# Patient Record
Sex: MALE | URBAN - METROPOLITAN AREA
[De-identification: names, ages, dates, MRNs, and addresses within clinical notes are randomized per-mention and may not be internally consistent; named-entity substitution may affect disease eponyms.]

---

## 2020-03-12 ENCOUNTER — INPATIENT (INPATIENT)
Facility: HOSPITAL | Age: 75
LOS: 11 days | Discharge: PSYCHIATRIC OR SUBSTANCE USE FACILITY | DRG: 884 | End: 2020-03-24
Attending: FAMILY MEDICINE | Admitting: HOSPITALIST
Payer: MEDICARE

## 2020-03-12 VITALS
SYSTOLIC BLOOD PRESSURE: 174 MMHG | DIASTOLIC BLOOD PRESSURE: 85 MMHG | OXYGEN SATURATION: 97 % | TEMPERATURE: 97 F | HEIGHT: 74 IN | WEIGHT: 214.95 LBS | RESPIRATION RATE: 16 BRPM | HEART RATE: 65 BPM

## 2020-03-12 DIAGNOSIS — E83.52 HYPERCALCEMIA: ICD-10-CM

## 2020-03-12 DIAGNOSIS — R41.0 DISORIENTATION, UNSPECIFIED: ICD-10-CM

## 2020-03-12 DIAGNOSIS — R45.1 RESTLESSNESS AND AGITATION: ICD-10-CM

## 2020-03-12 DIAGNOSIS — F03.91 UNSPECIFIED DEMENTIA WITH BEHAVIORAL DISTURBANCE: ICD-10-CM

## 2020-03-12 DIAGNOSIS — Z29.9 ENCOUNTER FOR PROPHYLACTIC MEASURES, UNSPECIFIED: ICD-10-CM

## 2020-03-12 LAB
ALBUMIN SERPL ELPH-MCNC: 3.5 G/DL — SIGNIFICANT CHANGE UP (ref 3.3–5)
ALP SERPL-CCNC: 102 U/L — SIGNIFICANT CHANGE UP (ref 40–120)
ALT FLD-CCNC: 19 U/L — SIGNIFICANT CHANGE UP (ref 12–78)
ANION GAP SERPL CALC-SCNC: 2 MMOL/L — LOW (ref 5–17)
APTT BLD: 31.7 SEC — SIGNIFICANT CHANGE UP (ref 28.5–37)
AST SERPL-CCNC: 17 U/L — SIGNIFICANT CHANGE UP (ref 15–37)
BASOPHILS # BLD AUTO: 0.02 K/UL — SIGNIFICANT CHANGE UP (ref 0–0.2)
BASOPHILS NFR BLD AUTO: 0.3 % — SIGNIFICANT CHANGE UP (ref 0–2)
BILIRUB SERPL-MCNC: 1.1 MG/DL — SIGNIFICANT CHANGE UP (ref 0.2–1.2)
BUN SERPL-MCNC: 21 MG/DL — SIGNIFICANT CHANGE UP (ref 7–23)
CALCIUM SERPL-MCNC: 10.4 MG/DL — HIGH (ref 8.5–10.1)
CHLORIDE SERPL-SCNC: 105 MMOL/L — SIGNIFICANT CHANGE UP (ref 96–108)
CO2 SERPL-SCNC: 29 MMOL/L — SIGNIFICANT CHANGE UP (ref 22–31)
CREAT SERPL-MCNC: 1.2 MG/DL — SIGNIFICANT CHANGE UP (ref 0.5–1.3)
EOSINOPHIL # BLD AUTO: 0.1 K/UL — SIGNIFICANT CHANGE UP (ref 0–0.5)
EOSINOPHIL NFR BLD AUTO: 1.3 % — SIGNIFICANT CHANGE UP (ref 0–6)
GLUCOSE SERPL-MCNC: 119 MG/DL — HIGH (ref 70–99)
HCT VFR BLD CALC: 48.4 % — SIGNIFICANT CHANGE UP (ref 39–50)
HGB BLD-MCNC: 16.4 G/DL — SIGNIFICANT CHANGE UP (ref 13–17)
IMM GRANULOCYTES NFR BLD AUTO: 0.7 % — SIGNIFICANT CHANGE UP (ref 0–1.5)
INR BLD: 1.09 RATIO — SIGNIFICANT CHANGE UP (ref 0.88–1.16)
LIDOCAIN IGE QN: 444 U/L — HIGH (ref 73–393)
LYMPHOCYTES # BLD AUTO: 0.88 K/UL — LOW (ref 1–3.3)
LYMPHOCYTES # BLD AUTO: 11.6 % — LOW (ref 13–44)
MCHC RBC-ENTMCNC: 29.6 PG — SIGNIFICANT CHANGE UP (ref 27–34)
MCHC RBC-ENTMCNC: 33.9 GM/DL — SIGNIFICANT CHANGE UP (ref 32–36)
MCV RBC AUTO: 87.4 FL — SIGNIFICANT CHANGE UP (ref 80–100)
MONOCYTES # BLD AUTO: 0.34 K/UL — SIGNIFICANT CHANGE UP (ref 0–0.9)
MONOCYTES NFR BLD AUTO: 4.5 % — SIGNIFICANT CHANGE UP (ref 2–14)
NEUTROPHILS # BLD AUTO: 6.18 K/UL — SIGNIFICANT CHANGE UP (ref 1.8–7.4)
NEUTROPHILS NFR BLD AUTO: 81.6 % — HIGH (ref 43–77)
NRBC # BLD: 0 /100 WBCS — SIGNIFICANT CHANGE UP (ref 0–0)
PLATELET # BLD AUTO: 205 K/UL — SIGNIFICANT CHANGE UP (ref 150–400)
POTASSIUM SERPL-MCNC: 5.1 MMOL/L — SIGNIFICANT CHANGE UP (ref 3.5–5.3)
POTASSIUM SERPL-SCNC: 5.1 MMOL/L — SIGNIFICANT CHANGE UP (ref 3.5–5.3)
PROT SERPL-MCNC: 7.4 G/DL — SIGNIFICANT CHANGE UP (ref 6–8.3)
PROTHROM AB SERPL-ACNC: 12.2 SEC — SIGNIFICANT CHANGE UP (ref 10–12.9)
RBC # BLD: 5.54 M/UL — SIGNIFICANT CHANGE UP (ref 4.2–5.8)
RBC # FLD: 13 % — SIGNIFICANT CHANGE UP (ref 10.3–14.5)
SODIUM SERPL-SCNC: 136 MMOL/L — SIGNIFICANT CHANGE UP (ref 135–145)
WBC # BLD: 7.57 K/UL — SIGNIFICANT CHANGE UP (ref 3.8–10.5)
WBC # FLD AUTO: 7.57 K/UL — SIGNIFICANT CHANGE UP (ref 3.8–10.5)

## 2020-03-12 PROCEDURE — 93010 ELECTROCARDIOGRAM REPORT: CPT

## 2020-03-12 PROCEDURE — 99285 EMERGENCY DEPT VISIT HI MDM: CPT

## 2020-03-12 PROCEDURE — 70450 CT HEAD/BRAIN W/O DYE: CPT | Mod: 26

## 2020-03-12 PROCEDURE — 71045 X-RAY EXAM CHEST 1 VIEW: CPT | Mod: 26

## 2020-03-12 PROCEDURE — 99222 1ST HOSP IP/OBS MODERATE 55: CPT | Mod: GC,AI

## 2020-03-12 PROCEDURE — 99221 1ST HOSP IP/OBS SF/LOW 40: CPT

## 2020-03-12 RX ORDER — HALOPERIDOL DECANOATE 100 MG/ML
5 INJECTION INTRAMUSCULAR ONCE
Refills: 0 | Status: COMPLETED | OUTPATIENT
Start: 2020-03-12 | End: 2020-03-12

## 2020-03-12 RX ORDER — ENOXAPARIN SODIUM 100 MG/ML
40 INJECTION SUBCUTANEOUS DAILY
Refills: 0 | Status: DISCONTINUED | OUTPATIENT
Start: 2020-03-12 | End: 2020-03-24

## 2020-03-12 RX ORDER — HALOPERIDOL DECANOATE 100 MG/ML
0.5 INJECTION INTRAMUSCULAR EVERY 6 HOURS
Refills: 0 | Status: DISCONTINUED | OUTPATIENT
Start: 2020-03-12 | End: 2020-03-12

## 2020-03-12 RX ORDER — OLANZAPINE 15 MG/1
10 TABLET, FILM COATED ORAL EVERY 6 HOURS
Refills: 0 | Status: DISCONTINUED | OUTPATIENT
Start: 2020-03-12 | End: 2020-03-24

## 2020-03-12 RX ORDER — SODIUM CHLORIDE 9 MG/ML
500 INJECTION, SOLUTION INTRAVENOUS ONCE
Refills: 0 | Status: DISCONTINUED | OUTPATIENT
Start: 2020-03-12 | End: 2020-03-15

## 2020-03-12 RX ORDER — SODIUM CHLORIDE 9 MG/ML
3 INJECTION INTRAMUSCULAR; INTRAVENOUS; SUBCUTANEOUS ONCE
Refills: 0 | Status: COMPLETED | OUTPATIENT
Start: 2020-03-12 | End: 2020-03-12

## 2020-03-12 RX ADMIN — HALOPERIDOL DECANOATE 5 MILLIGRAM(S): 100 INJECTION INTRAMUSCULAR at 12:20

## 2020-03-12 RX ADMIN — Medication 2 MILLIGRAM(S): at 14:53

## 2020-03-12 RX ADMIN — Medication 1 MILLIGRAM(S): at 13:15

## 2020-03-12 RX ADMIN — Medication 1 MILLIGRAM(S): at 12:16

## 2020-03-12 RX ADMIN — OLANZAPINE 10 MILLIGRAM(S): 15 TABLET, FILM COATED ORAL at 17:57

## 2020-03-12 RX ADMIN — SODIUM CHLORIDE 3 MILLILITER(S): 9 INJECTION INTRAMUSCULAR; INTRAVENOUS; SUBCUTANEOUS at 12:07

## 2020-03-12 NOTE — H&P ADULT - NSHPSOCIALHISTORY_GEN_ALL_CORE
Lives alone. Denies tobacco, alcohol, or illicit drug use. Ambulates independently. Has not been influenza or pneumonia vaccinated
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
## 2020-03-12 NOTE — H&P ADULT - ASSESSMENT
The patient is a 73 yo male with questionable pmhx of questionable dementia(undiagnosed) per son Orville who presents with altered mental status admitted for confusion.

## 2020-03-12 NOTE — ED ADULT NURSE NOTE - NSIMPLEMENTINTERV_GEN_ALL_ED
Implemented All Fall Risk Interventions:  [place] to call system. Call bell, personal items and telephone within reach. Instruct patient to call for assistance. Room bathroom lighting operational. Non-slip footwear when patient is off stretcher. Physically safe environment: no spills, clutter or unnecessary equipment. Stretcher in lowest position, wheels locked, appropriate side rails in place. Provide visual cue, wrist band, yellow gown, etc. Monitor gait and stability. Monitor for mental status changes and reorient to person, place, and time. Review medications for side effects contributing to fall risk. Reinforce activity limits and safety measures with patient and family.

## 2020-03-12 NOTE — H&P ADULT - NSHPREVIEWOFSYSTEMS_GEN_ALL_CORE
CONSTITUTIONAL: denies fever, chills, fatigue, weakness  HEENT: denies blurred vision, sore throat  CARDIOVASCULAR: denies chest pain, chest pressure, palpitations  RESPIRATORY: denies shortness of breath, sputum production  GASTROINTESTINAL: denies nausea, vomiting, diarrhea, abdominal pain  GENITOURINARY: denies dysuria, discharge  NEUROLOGICAL: denies numbness, headache, focal weakness  MUSCULOSKELETAL: denies new joint pain, muscle aches  HEMATOLOGIC: denies gross bleeding
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
## 2020-03-12 NOTE — BEHAVIORAL HEALTH ASSESSMENT NOTE - NSBHCHARTREVIEWVS_PSY_A_CORE FT
Vital Signs Last 24 Hrs  T(C): 36.3 (12 Mar 2020 11:33), Max: 36.3 (12 Mar 2020 11:33)  T(F): 97.4 (12 Mar 2020 11:33), Max: 97.4 (12 Mar 2020 11:33)  HR: 73 (12 Mar 2020 15:27) (58 - 73)  BP: 186/87 (12 Mar 2020 15:27) (148/67 - 186/87)  BP(mean): --  RR: 24 (12 Mar 2020 15:27) (16 - 24)  SpO2: 96% (12 Mar 2020 15:27) (96% - 97%)

## 2020-03-12 NOTE — H&P ADULT - PROBLEM SELECTOR PLAN 4
- DVT prophylaxis with Lovenox  IMPROVE VTE Individual Risk Assessment  RISK                                                                Points  [  ] Previous VTE                                                  3  [  ] Thrombophilia                                               2  [  ] Lower limb paralysis                                      2        (unable to hold up >15 seconds)   [  ] Current Cancer                                              2         (within 6 months)  [  ] Immobilization > 24 hrs                                1  [  ] ICU/CCU stay > 24 hours                              1  [ X ] Age > 60                                                      1  IMPROVE VTE Score __1_______

## 2020-03-12 NOTE — ED ADULT NURSE REASSESSMENT NOTE - NS ED NURSE REASSESS COMMENT FT1
1450 pt became extremely agitated Spoke w/ dr kuhn Ativan given as charted per order Pt punching kicking @ staff attempting to get out of bed Code rosie called

## 2020-03-12 NOTE — H&P ADULT - PROBLEM SELECTOR PLAN 1
- differential includes progressive dementia vs infectious vs possible TIA  - questionable hx of dementia as son admits symptoms have been progressive x2 year  - CT head involutional changes with volume loss. No acute abnormality suggested  - CxR no acute finding  - F/u UA, UC  - Given: Ativan 1mg x2, Ativan 1mg, Haldol 5mg x1  - Psych Dr. Xiao consulted  - Social work following as pt's family now considering assisted living/nursing home - differential includes progressive dementia vs infectious vs possible TIA  - less likely TIA as symptoms not acute  - questionable hx of dementia as son admits symptoms have been progressive x2 year  - CT head involutional changes with volume loss. No acute abnormality suggested  - CxR no acute finding  - F/u UA, UC  - Given: Ativan 1mg x2, Ativan 1mg, Haldol 5mg x1  - Psych Dr. Xiao consulted  - Social work following as pt's family now considering assisted living/nursing home - differential includes progressive dementia with behavioal symptoms vs infectious vs possible TIA  - less likely TIA as symptoms not acute  - questionable hx of dementia as son admits symptoms have been progressive x2 year  - CT head involutional changes with volume loss. No acute abnormality suggested  - CxR no acute finding  - F/u UA, UC  - Given: Ativan 1mg x2, Ativan 1mg, Haldol 5mg x1  - Psych Dr. Xiao consulted  - Social work following as pt's family now considering assisted living/nursing home likely due to dementia with behavioral disturbance  - questionable hx of dementia as son admits symptoms have been progressive x2 year  - CT head involutional changes with volume loss. No acute abnormality suggested  - CxR no acute finding  - F/u UA,  - Given: Ativan 1mg x2, Ativan 1mg, Haldol 5mg x1  - Psych Dr. Xiao consulted  - Social work following as pt's family now considering assisted living/nursing home
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
## 2020-03-12 NOTE — H&P ADULT - PROBLEM SELECTOR PLAN 3
IMPROVE VTE Individual Risk Assessment  - DVT prophylaxis with lovenox  RISK                                                                Points  [  ] Previous VTE                                                  3  [  ] Thrombophilia                                               2  [  ] Lower limb paralysis                                      2        (unable to hold up >15 seconds)   [  ] Current Cancer                                              2         (within 6 months)  [  ] Immobilization > 24 hrs                                1  [  ] ICU/CCU stay > 24 hours                              1  [ X ] Age > 60                                                      1  IMPROVE VTE Score __1_______ - 10.4  - likely 2/2 to dehydration  - stat 500cc bolus LR ordered  - f/u am CMP
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
## 2020-03-12 NOTE — BEHAVIORAL HEALTH ASSESSMENT NOTE - NSBHCHARTREVIEWLAB_PSY_A_CORE FT
16.4   7.57  )-----------( 205      ( 12 Mar 2020 12:05 )             48.4   03-12    136  |  105  |  21  ----------------------------<  119<H>  5.1   |  29  |  1.20    Ca    10.4<H>      12 Mar 2020 12:05    TPro  7.4  /  Alb  3.5  /  TBili  1.1  /  DBili  x   /  AST  17  /  ALT  19  /  AlkPhos  102  03-12
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
## 2020-03-12 NOTE — BEHAVIORAL HEALTH ASSESSMENT NOTE - NSBHCHARTREVIEWIMAGING_PSY_A_CORE FT
< from: CT Head No Cont (03.12.20 @ 13:47) >    EXAM:  CT BRAIN                            PROCEDURE DATE:  03/12/2020          INTERPRETATION:  INDICATION:  Confusion. Altered mental status.  TECHNIQUE:  A non contrast 2.5mm axial CT study of the brain was performed from skull base to vertex. Coronal and sagittal reformations were generated from the axial data.  COMPARISON EXAMINATION:  No prior    FINDINGS:      HEMISPHERES:  There are involutional changes with volume loss. This study is degraded by motion but is diagnostic. No acute infarct or hemorrhage is suggested. No space-occupying lesion is noted.  VENTRICLES:  Midline and normal in size.  POSTERIOR FOSSA:  The brain stem and cerebellum are unremarkable.  No CP angle lesion noted.  EXTRACEREBRAL SPACES:  No subdural or epidural collections are noted.  SKULL BASE AND CALVARIUM:  Appears intact.  No fracture or destructive lesion is identified.  SINUSES AND MASTOIDS:  Clear.  MISCELLANEOUS:  No orbital or suprasellar abnormality noted.      IMPRESSION:    1)  involutional changes with volume loss. No acute abnormality suggested.  2)  clear sinuses and mastoids..                  ROGE VARELA M.D., ATTENDING RADIOLOGIST  This document has been electronically signed. Mar 12 2020  1:57PM    < end of copied text >
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
## 2020-03-12 NOTE — H&P ADULT - NSHPPHYSICALEXAM_GEN_ALL_CORE
T(C): 36.3 (03-12-20 @ 11:33), Max: 36.3 (03-12-20 @ 11:33)  HR: 73 (03-12-20 @ 15:27) (58 - 73)  BP: 186/87 (03-12-20 @ 15:27) (148/67 - 186/87)  RR: 24 (03-12-20 @ 15:27) (16 - 24)  SpO2: 96% (03-12-20 @ 15:27) (96% - 97%)    GENERAL: patient appears well, no acute distress, sleepy 2/2 ativan  EYES: sclera clear, no exudates  ENMT: oropharynx clear without erythema, no exudates, moist mucous membranes  LUNGS: good air entry bilaterally, clear to auscultation, symmetric breath sounds, no wheezing or rhonchi appreciated  HEART: soft S1/S2, regular rate and rhythm, no murmurs noted, no lower extremity edema  GASTROINTESTINAL: abdomen is soft, nontender, nondistended, normoactive bowel sounds, no palpable masses  INTEGUMENT: good skin turgor, warm skin, appears well perfused; excoriation of skin b/l LE  MUSCULOSKELETAL: no clubbing or cyanosis, no obvious deformity  NEUROLOGIC: awake, alert, oriented x3, good muscle tone in 4 extremities, no obvious sensory deficits

## 2020-03-13 LAB
ALBUMIN SERPL ELPH-MCNC: 3.8 G/DL — SIGNIFICANT CHANGE UP (ref 3.3–5)
ALP SERPL-CCNC: 108 U/L — SIGNIFICANT CHANGE UP (ref 40–120)
ALT FLD-CCNC: 19 U/L — SIGNIFICANT CHANGE UP (ref 12–78)
ANION GAP SERPL CALC-SCNC: 6 MMOL/L — SIGNIFICANT CHANGE UP (ref 5–17)
AST SERPL-CCNC: 20 U/L — SIGNIFICANT CHANGE UP (ref 15–37)
BASOPHILS # BLD AUTO: 0.03 K/UL — SIGNIFICANT CHANGE UP (ref 0–0.2)
BASOPHILS NFR BLD AUTO: 0.2 % — SIGNIFICANT CHANGE UP (ref 0–2)
BILIRUB SERPL-MCNC: 2.3 MG/DL — HIGH (ref 0.2–1.2)
BUN SERPL-MCNC: 18 MG/DL — SIGNIFICANT CHANGE UP (ref 7–23)
CALCIUM SERPL-MCNC: 10.4 MG/DL — HIGH (ref 8.5–10.1)
CHLORIDE SERPL-SCNC: 104 MMOL/L — SIGNIFICANT CHANGE UP (ref 96–108)
CO2 SERPL-SCNC: 29 MMOL/L — SIGNIFICANT CHANGE UP (ref 22–31)
CREAT SERPL-MCNC: 1.1 MG/DL — SIGNIFICANT CHANGE UP (ref 0.5–1.3)
EOSINOPHIL # BLD AUTO: 0.18 K/UL — SIGNIFICANT CHANGE UP (ref 0–0.5)
EOSINOPHIL NFR BLD AUTO: 1.5 % — SIGNIFICANT CHANGE UP (ref 0–6)
FOLATE SERPL-MCNC: 6.7 NG/ML — SIGNIFICANT CHANGE UP
GLUCOSE SERPL-MCNC: 84 MG/DL — SIGNIFICANT CHANGE UP (ref 70–99)
HCT VFR BLD CALC: 50.4 % — HIGH (ref 39–50)
HCV AB S/CO SERPL IA: 0.27 S/CO — SIGNIFICANT CHANGE UP (ref 0–0.99)
HCV AB SERPL-IMP: SIGNIFICANT CHANGE UP
HGB BLD-MCNC: 16.9 G/DL — SIGNIFICANT CHANGE UP (ref 13–17)
IMM GRANULOCYTES NFR BLD AUTO: 0.3 % — SIGNIFICANT CHANGE UP (ref 0–1.5)
LYMPHOCYTES # BLD AUTO: 1.25 K/UL — SIGNIFICANT CHANGE UP (ref 1–3.3)
LYMPHOCYTES # BLD AUTO: 10.1 % — LOW (ref 13–44)
MCHC RBC-ENTMCNC: 29.2 PG — SIGNIFICANT CHANGE UP (ref 27–34)
MCHC RBC-ENTMCNC: 33.5 GM/DL — SIGNIFICANT CHANGE UP (ref 32–36)
MCV RBC AUTO: 87.2 FL — SIGNIFICANT CHANGE UP (ref 80–100)
MONOCYTES # BLD AUTO: 0.96 K/UL — HIGH (ref 0–0.9)
MONOCYTES NFR BLD AUTO: 7.8 % — SIGNIFICANT CHANGE UP (ref 2–14)
NEUTROPHILS # BLD AUTO: 9.9 K/UL — HIGH (ref 1.8–7.4)
NEUTROPHILS NFR BLD AUTO: 80.1 % — HIGH (ref 43–77)
NRBC # BLD: 0 /100 WBCS — SIGNIFICANT CHANGE UP (ref 0–0)
PLATELET # BLD AUTO: 213 K/UL — SIGNIFICANT CHANGE UP (ref 150–400)
POTASSIUM SERPL-MCNC: 4.3 MMOL/L — SIGNIFICANT CHANGE UP (ref 3.5–5.3)
POTASSIUM SERPL-SCNC: 4.3 MMOL/L — SIGNIFICANT CHANGE UP (ref 3.5–5.3)
PROT SERPL-MCNC: 7.6 G/DL — SIGNIFICANT CHANGE UP (ref 6–8.3)
RBC # BLD: 5.78 M/UL — SIGNIFICANT CHANGE UP (ref 4.2–5.8)
RBC # FLD: 13 % — SIGNIFICANT CHANGE UP (ref 10.3–14.5)
SODIUM SERPL-SCNC: 139 MMOL/L — SIGNIFICANT CHANGE UP (ref 135–145)
T PALLIDUM AB TITR SER: NEGATIVE — SIGNIFICANT CHANGE UP
T4 FREE SERPL-MCNC: 1.1 NG/DL — SIGNIFICANT CHANGE UP (ref 0.9–1.8)
TSH SERPL-MCNC: 1.97 UIU/ML — SIGNIFICANT CHANGE UP (ref 0.36–3.74)
VIT B12 SERPL-MCNC: 384 PG/ML — SIGNIFICANT CHANGE UP (ref 232–1245)
WBC # BLD: 12.36 K/UL — HIGH (ref 3.8–10.5)
WBC # FLD AUTO: 12.36 K/UL — HIGH (ref 3.8–10.5)

## 2020-03-13 PROCEDURE — 99232 SBSQ HOSP IP/OBS MODERATE 35: CPT

## 2020-03-13 RX ADMIN — Medication 2 MILLIGRAM(S): at 21:24

## 2020-03-13 RX ADMIN — Medication 2 MILLIGRAM(S): at 14:12

## 2020-03-13 RX ADMIN — Medication 2 MILLIGRAM(S): at 00:49

## 2020-03-13 RX ADMIN — ENOXAPARIN SODIUM 40 MILLIGRAM(S): 100 INJECTION SUBCUTANEOUS at 14:13

## 2020-03-13 NOTE — PROGRESS NOTE ADULT - PROBLEM SELECTOR PLAN 1
likely due to dementia with behavioral disturbance  - questionable hx of dementia as son admits symptoms have been progressive x2 year  - CT head involutional changes with volume loss. No acute abnormality suggested  - CxR no acute finding  - F/u UA,  - Given: Ativan 1mg x2, Ativan 1mg, Haldol 5mg x1  - Psych Dr. Xiao consulted  - Social work following as pt's family now considering assisted living/nursing home

## 2020-03-13 NOTE — PROGRESS NOTE ADULT - SUBJECTIVE AND OBJECTIVE BOX
Patient is a 74y old  Male who presents with a chief complaint of Confusion (12 Mar 2020 15:37)      INTERVAL HPI/OVERNIGHT EVENTS: The patient is a 73 yo male with questionable pmhx of questionable dementia(undiagnosed) per son Orville who presents with altered mental status admitted for confusion. pt is still agitated, continue restrain and constant observation.     MEDICATIONS  (STANDING):  enoxaparin Injectable 40 milliGRAM(s) SubCutaneous daily  lactated ringers Bolus 500 milliLiter(s) IV Bolus once    MEDICATIONS  (PRN):  LORazepam   Injectable 2 milliGRAM(s) IV Push every 6 hours PRN Agitation  OLANZapine Injectable 10 milliGRAM(s) IntraMuscular every 6 hours PRN acute agitation      Allergies    No Known Allergies    Intolerances        REVIEW OF SYSTEMS:  [place] due to dementia .    Vital Signs Last 24 Hrs  T(C): 37 (13 Mar 2020 07:45), Max: 37.4 (12 Mar 2020 20:23)  T(F): 98.6 (13 Mar 2020 07:45), Max: 99.4 (12 Mar 2020 20:23)  HR: 65 (13 Mar 2020 07:45) (61 - 67)  BP: 133/76 (13 Mar 2020 07:45) (133/76 - 163/82)  BP(mean): --  RR: 19 (13 Mar 2020 07:45) (18 - 20)  SpO2: 99% (13 Mar 2020 07:45) (95% - 100%)    PHYSICAL EXAM:  GENERAL: NAD, well-groomed, well-developed  HEAD:  Atraumatic, Normocephalic  EYES: EOMI, PERRLA, conjunctiva and sclera clear  ENMT: No tonsillar erythema, exudates, or enlargement; Moist mucous membranes, Good dentition, No lesions  NECK: Supple, No JVD, Normal thyroid  NERVOUS SYSTEM: UTO due to dementia.   CHEST/LUNG: Clear to auscultation bilaterally; No rales, rhonchi, wheezing, or rubs  HEART: Regular rate and rhythm; No murmurs, rubs, or gallops  ABDOMEN: Soft, Nontender, Nondistended; Bowel sounds present  EXTREMITIES:  2+ Peripheral Pulses, No clubbing, cyanosis, or edema  LYMPH: No lymphadenopathy noted  SKIN: No rashes or lesions    LABS:                        16.9   12.36 )-----------( 213      ( 13 Mar 2020 07:00 )             50.4     13 Mar 2020 07:00    139    |  104    |  18     ----------------------------<  84     4.3     |  29     |  1.10     Ca    10.4       13 Mar 2020 07:00    TPro  7.6    /  Alb  3.8    /  TBili  2.3    /  DBili  x      /  AST  20     /  ALT  19     /  AlkPhos  108    13 Mar 2020 07:00    PT/INR - ( 12 Mar 2020 12:05 )   PT: 12.2 sec;   INR: 1.09 ratio         PTT - ( 12 Mar 2020 12:05 )  PTT:31.7 sec    CAPILLARY BLOOD GLUCOSE          RADIOLOGY & ADDITIONAL TESTS:    Imaging Personally Reviewed:  [x ] YES  [ ] NO    Consultant(s) Notes Reviewed:  [x ] YES  [ ] NO    Care Discussed with Consultants/Other Providers [x ] YES  [ ] NO

## 2020-03-13 NOTE — PROGRESS NOTE ADULT - ASSESSMENT
The patient is a 75 yo male with questionable pmhx of questionable dementia(undiagnosed) per son Orville who presents with altered mental status admitted for confusion.
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
## 2020-03-14 LAB
ANION GAP SERPL CALC-SCNC: 6 MMOL/L — SIGNIFICANT CHANGE UP (ref 5–17)
BUN SERPL-MCNC: 23 MG/DL — SIGNIFICANT CHANGE UP (ref 7–23)
CALCIUM SERPL-MCNC: 10.8 MG/DL — HIGH (ref 8.5–10.1)
CHLORIDE SERPL-SCNC: 106 MMOL/L — SIGNIFICANT CHANGE UP (ref 96–108)
CO2 SERPL-SCNC: 27 MMOL/L — SIGNIFICANT CHANGE UP (ref 22–31)
CREAT SERPL-MCNC: 1.3 MG/DL — SIGNIFICANT CHANGE UP (ref 0.5–1.3)
GLUCOSE SERPL-MCNC: 104 MG/DL — HIGH (ref 70–99)
HCT VFR BLD CALC: 50.3 % — HIGH (ref 39–50)
HGB BLD-MCNC: 17.1 G/DL — HIGH (ref 13–17)
MCHC RBC-ENTMCNC: 29.3 PG — SIGNIFICANT CHANGE UP (ref 27–34)
MCHC RBC-ENTMCNC: 34 GM/DL — SIGNIFICANT CHANGE UP (ref 32–36)
MCV RBC AUTO: 86.3 FL — SIGNIFICANT CHANGE UP (ref 80–100)
NRBC # BLD: 0 /100 WBCS — SIGNIFICANT CHANGE UP (ref 0–0)
PLATELET # BLD AUTO: 210 K/UL — SIGNIFICANT CHANGE UP (ref 150–400)
POTASSIUM SERPL-MCNC: 3.9 MMOL/L — SIGNIFICANT CHANGE UP (ref 3.5–5.3)
POTASSIUM SERPL-SCNC: 3.9 MMOL/L — SIGNIFICANT CHANGE UP (ref 3.5–5.3)
RBC # BLD: 5.83 M/UL — HIGH (ref 4.2–5.8)
RBC # FLD: 13.2 % — SIGNIFICANT CHANGE UP (ref 10.3–14.5)
SODIUM SERPL-SCNC: 139 MMOL/L — SIGNIFICANT CHANGE UP (ref 135–145)
WBC # BLD: 11.79 K/UL — HIGH (ref 3.8–10.5)
WBC # FLD AUTO: 11.79 K/UL — HIGH (ref 3.8–10.5)

## 2020-03-14 PROCEDURE — 99232 SBSQ HOSP IP/OBS MODERATE 35: CPT

## 2020-03-14 PROCEDURE — 71045 X-RAY EXAM CHEST 1 VIEW: CPT | Mod: 26

## 2020-03-14 RX ORDER — SODIUM CHLORIDE 9 MG/ML
1000 INJECTION INTRAMUSCULAR; INTRAVENOUS; SUBCUTANEOUS
Refills: 0 | Status: DISCONTINUED | OUTPATIENT
Start: 2020-03-14 | End: 2020-03-15

## 2020-03-14 RX ORDER — RISPERIDONE 4 MG/1
1 TABLET ORAL
Refills: 0 | Status: DISCONTINUED | OUTPATIENT
Start: 2020-03-14 | End: 2020-03-20

## 2020-03-14 RX ADMIN — SODIUM CHLORIDE 75 MILLILITER(S): 9 INJECTION INTRAMUSCULAR; INTRAVENOUS; SUBCUTANEOUS at 12:16

## 2020-03-14 RX ADMIN — Medication 1 MILLIGRAM(S): at 23:33

## 2020-03-14 RX ADMIN — ENOXAPARIN SODIUM 40 MILLIGRAM(S): 100 INJECTION SUBCUTANEOUS at 12:17

## 2020-03-14 RX ADMIN — Medication 2 MILLIGRAM(S): at 08:51

## 2020-03-14 RX ADMIN — OLANZAPINE 10 MILLIGRAM(S): 15 TABLET, FILM COATED ORAL at 22:39

## 2020-03-14 NOTE — PROGRESS NOTE ADULT - SUBJECTIVE AND OBJECTIVE BOX
Patient is a 75y old  Male who presents with a chief complaint of Confusion (13 Mar 2020 15:37)      INTERVAL HPI/OVERNIGHT EVENTS: The patient is a 73 yo male with questionable pmhx of questionable dementia(undiagnosed) per son Orville who presents with altered mental status admitted for confusion. still confused and agitated.     MEDICATIONS  (STANDING):  enoxaparin Injectable 40 milliGRAM(s) SubCutaneous daily  lactated ringers Bolus 500 milliLiter(s) IV Bolus once  sodium chloride 0.9%. 1000 milliLiter(s) (75 mL/Hr) IV Continuous <Continuous>    MEDICATIONS  (PRN):  LORazepam   Injectable 2 milliGRAM(s) IV Push every 6 hours PRN Agitation  OLANZapine Injectable 10 milliGRAM(s) IntraMuscular every 6 hours PRN acute agitation      Allergies    No Known Allergies    Intolerances        REVIEW OF SYSTEMS:  UTO 2/2 confusion      Vital Signs Last 24 Hrs  T(C): 36.9 (14 Mar 2020 15:42), Max: 37.8 (14 Mar 2020 07:53)  T(F): 98.5 (14 Mar 2020 15:42), Max: 100 (14 Mar 2020 07:53)  HR: 59 (14 Mar 2020 15:42) (59 - 75)  BP: 154/86 (14 Mar 2020 15:42) (119/73 - 159/92)  BP(mean): --  RR: 18 (14 Mar 2020 15:42) (17 - 18)  SpO2: 94% (14 Mar 2020 15:42) (91% - 95%)    PHYSICAL EXAM:  GENERAL: NAD, well-groomed, well-developed  HEAD:  Atraumatic, Normocephalic  EYES: EOMI, PERRLA, conjunctiva and sclera clear  ENMT: No tonsillar erythema, exudates, or enlargement; Moist mucous membranes, Good dentition, No lesions  NECK: Supple, No JVD, Normal thyroid  NERVOUS SYSTEM:  UTO, confusion   CHEST/LUNG: Clear to auscultation bilaterally; No rales, rhonchi, wheezing, or rubs  HEART: Regular rate and rhythm; No murmurs, rubs, or gallops  ABDOMEN: Soft, Nontender, Nondistended; Bowel sounds present  EXTREMITIES:  2+ Peripheral Pulses, No clubbing, cyanosis, or edema  LYMPH: No lymphadenopathy noted  SKIN: No rashes or lesions    LABS:                        17.1   11.79 )-----------( 210      ( 14 Mar 2020 08:19 )             50.3     14 Mar 2020 08:19    139    |  106    |  23     ----------------------------<  104    3.9     |  27     |  1.30     Ca    10.8       14 Mar 2020 08:19          CAPILLARY BLOOD GLUCOSE          RADIOLOGY & ADDITIONAL TESTS:    Imaging Personally Reviewed:  [ ] YES  [ ] NO    Consultant(s) Notes Reviewed:  [ ] YES  [ ] NO    Care Discussed with Consultants/Other Providers [ ] YES  [ ] NO

## 2020-03-14 NOTE — PROGRESS NOTE ADULT - PROBLEM SELECTOR PLAN 2
- pt became agitated in the ED x1 episode with one episode of violence and striking staff  - Given: Ativan 1mg x2, Ativan 1mg, Haldol 5mg x1 in ED  - likely 2/2 confusion associated with dementia with behavioral symptoms  - as pt violent placed on restraints and constant observation  - c/w Zyprexa and SRQ   - Psych Dr. Xiao following - pt became agitated in the ED x1 episode with one episode of violence and striking staff  - Given: Ativan 1mg x2, Ativan 1mg, Haldol 5mg x1 in ED  - likely 2/2 confusion associated with dementia with behavioral symptoms  - as pt violent placed on restraints and constant observation  - c/w Zyprexa and Risperdal r  - Psych Dr. Xiao following

## 2020-03-14 NOTE — PROGRESS NOTE ADULT - PROBLEM SELECTOR PLAN 1
likely due to dementia with behavioral disturbance  - questionable hx of dementia as son admits symptoms have been progressive x2 year  - CT head involutional changes with volume loss. No acute abnormality suggested  - CxR no acute finding  - F/u UA,  - Given: Ativan 1mg x2, Ativan 1mg, Haldol 5mg x1  - Psych Dr. Xiao consulted  - Social work following as pt's family now considering assisted living/nursing home  spoke to Dr Xiao , recommended to start him on SRQ, d/c ativan and keep zyprexa as needed. likely due to dementia with behavioral disturbance  - questionable hx of dementia as son admits symptoms have been progressive x2 year  - CT head involutional changes with volume loss. No acute abnormality suggested  - CxR no acute finding  - F/u UA,  - Given: Ativan 1mg x2, Ativan 1mg, Haldol 5mg x1  - Psych Dr. Xiao consulted  - Social work following as pt's family now considering assisted living/nursing home  spoke to Dr Xiao , recommended to start him on Risperdal , d/c ativan and keep zyprexa as needed.

## 2020-03-14 NOTE — CHART NOTE - NSCHARTNOTEFT_GEN_A_CORE
Ty England called for patient actively kicking aids and RNs and getting out of bed despite redirection. Patient broke his hand restraints. Patient seen and assessed at bedside, unable to be redirected. Noted patient refused his risperidone tonight. Will give 1x IVP ativan for severe agitation. Continue to monitor closely. Continue constant observation

## 2020-03-15 LAB
ANION GAP SERPL CALC-SCNC: 7 MMOL/L — SIGNIFICANT CHANGE UP (ref 5–17)
BUN SERPL-MCNC: 23 MG/DL — SIGNIFICANT CHANGE UP (ref 7–23)
CALCIUM SERPL-MCNC: 10.4 MG/DL — HIGH (ref 8.5–10.1)
CHLORIDE SERPL-SCNC: 106 MMOL/L — SIGNIFICANT CHANGE UP (ref 96–108)
CO2 SERPL-SCNC: 29 MMOL/L — SIGNIFICANT CHANGE UP (ref 22–31)
CREAT SERPL-MCNC: 1.1 MG/DL — SIGNIFICANT CHANGE UP (ref 0.5–1.3)
GLUCOSE SERPL-MCNC: 90 MG/DL — SIGNIFICANT CHANGE UP (ref 70–99)
HCT VFR BLD CALC: 49.9 % — SIGNIFICANT CHANGE UP (ref 39–50)
HGB BLD-MCNC: 16.6 G/DL — SIGNIFICANT CHANGE UP (ref 13–17)
MCHC RBC-ENTMCNC: 29.3 PG — SIGNIFICANT CHANGE UP (ref 27–34)
MCHC RBC-ENTMCNC: 33.3 GM/DL — SIGNIFICANT CHANGE UP (ref 32–36)
MCV RBC AUTO: 88 FL — SIGNIFICANT CHANGE UP (ref 80–100)
NRBC # BLD: 0 /100 WBCS — SIGNIFICANT CHANGE UP (ref 0–0)
PLATELET # BLD AUTO: 204 K/UL — SIGNIFICANT CHANGE UP (ref 150–400)
POTASSIUM SERPL-MCNC: 3.6 MMOL/L — SIGNIFICANT CHANGE UP (ref 3.5–5.3)
POTASSIUM SERPL-SCNC: 3.6 MMOL/L — SIGNIFICANT CHANGE UP (ref 3.5–5.3)
RBC # BLD: 5.67 M/UL — SIGNIFICANT CHANGE UP (ref 4.2–5.8)
RBC # FLD: 13.2 % — SIGNIFICANT CHANGE UP (ref 10.3–14.5)
SODIUM SERPL-SCNC: 142 MMOL/L — SIGNIFICANT CHANGE UP (ref 135–145)
WBC # BLD: 10.7 K/UL — HIGH (ref 3.8–10.5)
WBC # FLD AUTO: 10.7 K/UL — HIGH (ref 3.8–10.5)

## 2020-03-15 PROCEDURE — 99233 SBSQ HOSP IP/OBS HIGH 50: CPT

## 2020-03-15 RX ORDER — HALOPERIDOL DECANOATE 100 MG/ML
1 INJECTION INTRAMUSCULAR ONCE
Refills: 0 | Status: COMPLETED | OUTPATIENT
Start: 2020-03-15 | End: 2020-03-15

## 2020-03-15 RX ADMIN — OLANZAPINE 10 MILLIGRAM(S): 15 TABLET, FILM COATED ORAL at 12:41

## 2020-03-15 RX ADMIN — ENOXAPARIN SODIUM 40 MILLIGRAM(S): 100 INJECTION SUBCUTANEOUS at 12:41

## 2020-03-15 RX ADMIN — Medication 1 MILLIGRAM(S): at 09:30

## 2020-03-15 RX ADMIN — HALOPERIDOL DECANOATE 1 MILLIGRAM(S): 100 INJECTION INTRAMUSCULAR at 21:37

## 2020-03-15 RX ADMIN — OLANZAPINE 10 MILLIGRAM(S): 15 TABLET, FILM COATED ORAL at 21:46

## 2020-03-15 NOTE — CHART NOTE - NSCHARTNOTEFT_GEN_A_CORE
Resident Rapid Response Note    Patient is a 75y old  Male who presents with a chief complaint of Confusion (14 Mar 2020 15:56)      Rapid response was called at on this 75y Male patient for agitation.  Patient was reportedly banging on window and refusing medications. Code grey was called.  Rapid response was eventually called.      Patient was seen and examined at the bedside by the rapid response team and primary team. ICU VIVIAN Rasmussen at bedside. Dr. Turcios at bedside.     Rapid Response Vital Signs:  BP:  HR:  RR:  SpO2: % on   Temp:  FS:    Vital Signs Last 24 Hrs  T(C): 37.3 (15 Mar 2020 07:35), Max: 37.6 (15 Mar 2020 01:32)  T(F): 99.2 (15 Mar 2020 07:35), Max: 99.7 (15 Mar 2020 01:32)  HR: 74 (15 Mar 2020 07:35) (59 - 76)  BP: 161/95 (15 Mar 2020 07:35) (154/86 - 171/93)  BP(mean): --  RR: 18 (15 Mar 2020 07:35) (18 - 18)  SpO2: 90% (15 Mar 2020 07:35) (90% - 94%)    Physical Exam:  General: Well developed, well nourished, in no acute distress  HEENT: NCAT, PERRLA, EOMI bl, moist mucous membranes   Neck: Supple, nontender, no mass  Neurology: A&Ox3, nonfocal, CN II-XII grossly intact, sensation intact, no gait abnormalities   Respiratory: CTA B/L, No wheezing, rales, or rhonchi  CV: RRR, S1/S2 present, no murmurs, rubs, or gallops  Abdominal: Soft, nontender, non-distended, normoactive bowel sounds  Extremities: No C/C/E, peripheral pulses present  MSK: Normal ROM, no joint erythema or warmth, no joint swelling   Skin: warm, dry, normal color, no obvious rash or abnormal lesions    LABS:                        16.6   10.70 )-----------( 204      ( 15 Mar 2020 05:42 )             49.9     15 Mar 2020 05:42    142    |  106    |  23     ----------------------------<  90     3.6     |  29     |  1.10     Ca    10.4       15 Mar 2020 05:42          CAPILLARY BLOOD GLUCOSE            RADIOLOGY & ADDITIONAL TESTS:      Assessment/Plan:   75 yo male with questionable pmhx of questionable dementia(undiagnosed) per son Orville who presents with altered mental status admitted for confusion    1. FOr agitation     - Continue constant observation.  - Due for dose of Xyprexa 10mg IM. If agitation persists, 1mg Haldol.  - Discussed with Dr. Turcios.  Plan to do 2PC and transfer to [facility].    - For the IV line which was removed by patient, attempt to place new IV other can transition IV Ativan to alternative medication/route.  - call placed out to sonOrville, given current circumstance Hospital policy is to limit family members however patient will benefit from reorientation per Nurse management and his family member can visit.    - Will continue to follow, RN to call if any changes. Resident Rapid Response Note    Patient is a 75y old  Male who presents with a chief complaint of Confusion (14 Mar 2020 15:56)      Rapid response was called at on this 75y Male patient for agitation.  Patient was reportedly banging on window and refusing medications. Code grey was called.  Rapid response was eventually called.      Patient was seen and examined at the bedside by the rapid response team and primary team. ICU VIVIAN Rasmussen at bedside. Dr. Turcios at bedside. Patient is slighlty irritated, sitting on the side of his bed. He states he does not want medications and would like to go home. He thinks he is in a mental hospital.     Rapid Response Vital Signs:  unable yo be obtained due to patient being uncooperative     Vital Signs Last 24 Hrs  T(C): 37.3 (15 Mar 2020 07:35), Max: 37.6 (15 Mar 2020 01:32)  T(F): 99.2 (15 Mar 2020 07:35), Max: 99.7 (15 Mar 2020 01:32)  HR: 74 (15 Mar 2020 07:35) (59 - 76)  BP: 161/95 (15 Mar 2020 07:35) (154/86 - 171/93)  RR: 18 (15 Mar 2020 07:35) (18 - 18)  SpO2: 90% (15 Mar 2020 07:35) (90% - 94%)    Physical Exam:  General: in no acute distress, slightly irritated   Neurology: A&Ox2-3 (knew the year and his self, patient states he is in a mental hospital)  Extremities: No C/C/E  MSK: Normal ROM, 5/5 muscle strength   Skin: warm, dry, normal color    LABS:                        16.6   10.70 )-----------( 204      ( 15 Mar 2020 05:42 )             49.9     15 Mar 2020 05:42    142    |  106    |  23     ----------------------------<  90     3.6     |  29     |  1.10     Ca    10.4       15 Mar 2020 05:42          CAPILLARY BLOOD GLUCOSE            RADIOLOGY & ADDITIONAL TESTS:      Assessment/Plan:   75 yo male with questionable pmhx of questionable dementia(undiagnosed) per son Orville who presents with altered mental status admitted for confusion    1. For agitation     - Continue constant observation.  - Received 1 dose of Xyprexa 10mg IM. If agitation persists, 1mg Haldol.  - Discussed with Dr. Turcios.  Plan to do 2PC and transfer to [facility] most likely in AM    - For the IV line which was removed by patient, attempt to place new IV other can transition IV Ativan to alternative medication/route.  - call placed out to son, Orville, given current circumstance Hospital policy is to limit family members however patient will benefit from reorientation per Nurse management and his family member can visit.    - Will continue to follow, RN to call if any changes. Resident Rapid Response Note    Patient is a 75y old  Male who presents with a chief complaint of Confusion (14 Mar 2020 15:56)      Rapid response was called on this 75y Male patient for agitation.  Patient was reportedly banging on window and refusing medications and code grey was called.  Rapid response was called shortly thereafter.      Patient was seen and examined at the bedside by the rapid response team and primary team. ICU VIVIAN Rasmussen at bedside. Dr. Turcios at bedside. Patient is slightly irritated, sitting on the side of his bed. He states he does not want medications and would like to go home. He thinks he is in a mental hospital.     Rapid Response Vital Signs:  unable to be obtained due to patient being uncooperative     Vital Signs Last 24 Hrs  T(C): 37.3 (15 Mar 2020 07:35), Max: 37.6 (15 Mar 2020 01:32)  T(F): 99.2 (15 Mar 2020 07:35), Max: 99.7 (15 Mar 2020 01:32)  HR: 74 (15 Mar 2020 07:35) (59 - 76)  BP: 161/95 (15 Mar 2020 07:35) (154/86 - 171/93)  RR: 18 (15 Mar 2020 07:35) (18 - 18)  SpO2: 90% (15 Mar 2020 07:35) (90% - 94%)    Physical Exam:  General: in no acute distress, slightly irritated   Neurology: A&Ox2-3 (knew the year and his self, patient states he is in a mental hospital)  Extremities: No C/C/E  MSK: Normal ROM, 5/5 muscle strength   Skin: warm, dry, normal color    LABS:                        16.6   10.70 )-----------( 204      ( 15 Mar 2020 05:42 )             49.9     15 Mar 2020 05:42    142    |  106    |  23     ----------------------------<  90     3.6     |  29     |  1.10     Ca    10.4       15 Mar 2020 05:42        Assessment/Plan:   75 yo male with questionable pmhx of questionable dementia(undiagnosed) per son Orville who presents with altered mental status admitted for confusion. Rapid response called for agitation.     1. For agitation   - During rapid response, attempted to reorient patient multiple times by different providers, unable to reorient and patient was persistent on leaving hospital. Call was placed to son Orville during rapid response, who also tried to reorient patient.  Patient does not have capacity and is not a safe discharge home.  Psych already consulted on case and patient needs further psychiatric safety assessment prior to discharge  - Continue constant observation.  - Received 1 dose of Xyprexa 10mg IM. If agitation persists, 1mg Haldol. Patient also placed on b/l wrist retraints.  - Discussed with Dr. Turcios at bedside.  Plan to do 2PC and transfer to [facility] most likely in AM    - For the IV line which was removed by patient, attempt to place new IV so that IV Ativan can be given.  - Call placed out to sonOrville. Though current Hospital policy is to limit family members however patient will benefit from reorientation from family members and per Nurse management, his family member can visit.    - Will continue to follow, RN to call if any changes.

## 2020-03-15 NOTE — PROGRESS NOTE ADULT - PROBLEM SELECTOR PLAN 2
- pt became agitated in the ED x1 episode with one episode of violence and striking staff  - Given: Ativan 1mg x2, Ativan 1mg, Haldol 5mg x1 in ED  - likely 2/2 confusion associated with dementia with behavioral symptoms  - as pt violent placed on restraints and constant observation  - c/w Zyprexa and Risperdal r  - Psych Dr. Xiao following

## 2020-03-15 NOTE — PROGRESS NOTE ADULT - PROBLEM SELECTOR PLAN 1
likely due to dementia with behavioral disturbance  - questionable hx of dementia as son admits symptoms have been progressive x2 year  - CT head involutional changes with volume loss. No acute abnormality suggested  - CxR no acute finding  - F/u UA,  - Given: Ativan 1mg x2, Ativan 1mg, Haldol 5mg x1  - Psych Dr. Xiao consulted  - Social work following as pt's family now considering assisted living/nursing home  spoke to Dr Xiao , recommended to start him on Risperdal , continue IV ativan and keep zyprexa as needed IM, pt is very agitated and combative.   plan is to discharge him to inpatient psych.  2 PC signed , waiting for Dr Xiao to sign and final determination.

## 2020-03-15 NOTE — CHART NOTE - NSCHARTNOTEFT_GEN_A_CORE
RAPID RESPONSE FOLLOW UP NOTE    Patient seen and examined at bedside. RR called @ 1200 for agitation and confusion. Pt reassessed at bedside. He is comfortable and sleeping, he is currently in wrist restraints. Patient is much calmer than when previously assessed. He states he feels tired but has no acute complaints. He still believes he is in a mental hospital but is redirectrable. Denies headaches, dizziness, chest pain, palpitations, abdominal pain, n/v/d or any other complaints.    Vital Signs Last 24 Hrs  T(C): 37.3 (15 Mar 2020 07:35), Max: 37.6 (15 Mar 2020 01:32)  T(F): 99.2 (15 Mar 2020 07:35), Max: 99.7 (15 Mar 2020 01:32)  HR: 74 (15 Mar 2020 07:35) (59 - 76)  BP: 161/95 (15 Mar 2020 07:35) (154/86 - 171/93)  RR: 18 (15 Mar 2020 07:35) (18 - 18)  SpO2: 90% (15 Mar 2020 07:35) (90% - 94%)    PHYSICAL EXAM:  GENERAL: NAD, lying in bed comfortably, wrist restraints in place   HEAD:  Atraumatic, Normocephalic  EYES: PERRL, conjunctiva clear   CHEST/LUNG: Clear to auscultation bilaterally; No rales, rhonchi, wheezing, or rubs. Unlabored respirations  HEART: Regular rate and rhythm; No murmurs, rubs, or gallops  ABDOMEN: Bowel sounds present; Soft, Nontender, Nondistended   EXTREMITIES:  No clubbing, cyanosis, or edema  NERVOUS SYSTEM:  Alert & Oriented X2-3 (person and time, believes he is in a mental status).        LABS:                        16.6   10.70 )-----------( 204      ( 15 Mar 2020 05:42 )             49.9       03-15    142  |  106  |  23  ----------------------------<  90  3.6   |  29  |  1.10    Ca    10.4<H>      15 Mar 2020 05:42            CAPILLARY BLOOD GLUCOSE                                 I&O's Summary    14 Mar 2020 07:01  -  15 Mar 2020 07:00  --------------------------------------------------------  IN: 790 mL / OUT: 1 mL / NET: 789 mL      IMAGING:    ASSESSMENT/ PLAN:  s/p Rapid Response for confusion/agitation.   - Patient stable s/p RR. He is still in wrist restraints but has not required additional meds for agitation. He is resting comfortably in bed. He currently has no complaints. Son to visit patient later today to help reorient him.   - Continue constant observation.  - S/p 1 dose of Xyprexa 10mg IM. If agitation persists, 1mg Haldol. Patient also placed on b/l wrist restraints.  - Plan to do 2PC and transfer to [facility] most likely in AM    - Will continue to follow, RN to call if any changes.

## 2020-03-15 NOTE — CHART NOTE - NSCHARTNOTEFT_GEN_A_CORE
Ty England called on patient for agitation  Patient [place]. Patient combative and at risk of harming others. He broke wrist restraints.  Patient seen and assessed unable to be redirected at this time.  Will give one stat dose of haldol 1mg at this time IM.  Continue to monitor closely. Continue constant observation.      Addendum: patient is still combative and not re-directable after above dose. Will give 1 dose of Zyprexa as per orders 10mg IM. Keep room quiet and decrease light to decrease stimuli. Continue constant observation. Ty England called on patient for agitation  Patient [place]. Patient combative and at risk of harming others. He broke wrist restraints.  Patient seen and assessed unable to be redirected at this time.  Will give one stat dose of haldol 1mg at this time IM.  Continue to monitor closely. Continue constant observation.      Addendum: patient is still combative and not re-directable after above dose. Will give 1 dose of Zyprexa as per orders 10mg IM. Keep room quiet and decrease light to decrease stimuli. Continue constant observation.      Addendum 1:15am: Patient agitated, combative, climbing out of bed. Patient changed to Ativan 1mg IM prn however did not calm down. Will give one dose of zyprexa 5mg x1 IM. Max dose as verified by pharmacy is 30mg/day, patient has received 20mg so far. Will continue to monitor

## 2020-03-15 NOTE — PROGRESS NOTE ADULT - SUBJECTIVE AND OBJECTIVE BOX
Patient is a 75y old  Male who presents with a chief complaint of Confusion (14 Mar 2020 15:56)      INTERVAL HPI/OVERNIGHT EVENTS: The patient is a 73 yo male with questionable pmhx of questionable dementia(undiagnosed) per son Orville who presents with altered mental status admitted for confusion. pt is very agitated and combative.     MEDICATIONS  (STANDING):  enoxaparin Injectable 40 milliGRAM(s) SubCutaneous daily  risperiDONE   Tablet 1 milliGRAM(s) Oral two times a day    MEDICATIONS  (PRN):  LORazepam   Injectable 1 milliGRAM(s) IV Push every 8 hours PRN Agitation  OLANZapine Injectable 10 milliGRAM(s) IntraMuscular every 6 hours PRN acute agitation      Allergies    No Known Allergies    Intolerances        REVIEW OF SYSTEMS:  UTO confused     Vital Signs Last 24 Hrs  T(C): 37.3 (15 Mar 2020 07:35), Max: 37.6 (15 Mar 2020 01:32)  T(F): 99.2 (15 Mar 2020 07:35), Max: 99.7 (15 Mar 2020 01:32)  HR: 74 (15 Mar 2020 07:35) (59 - 76)  BP: 161/95 (15 Mar 2020 07:35) (154/86 - 171/93)  BP(mean): --  RR: 18 (15 Mar 2020 07:35) (18 - 18)  SpO2: 90% (15 Mar 2020 07:35) (90% - 94%)    PHYSICAL EXAM:  GENERAL: NAD, well-groomed, well-developed  HEAD:  Atraumatic, Normocephalic  EYES: EOMI, PERRLA, conjunctiva and sclera clear  ENMT: No tonsillar erythema, exudates, or enlargement; Moist mucous membranes, Good dentition, No lesions  NECK: Supple, No JVD, Normal thyroid  NERVOUS SYSTEM: confused, agitated   CHEST/LUNG: Clear to auscultation bilaterally; No rales, rhonchi, wheezing, or rubs  HEART: Regular rate and rhythm; No murmurs, rubs, or gallops  ABDOMEN: Soft, Nontender, Nondistended; Bowel sounds present  EXTREMITIES:  2+ Peripheral Pulses, No clubbing, cyanosis, or edema  LYMPH: No lymphadenopathy noted  SKIN: No rashes or lesions    LABS:                        16.6   10.70 )-----------( 204      ( 15 Mar 2020 05:42 )             49.9     15 Mar 2020 05:42    142    |  106    |  23     ----------------------------<  90     3.6     |  29     |  1.10     Ca    10.4       15 Mar 2020 05:42          CAPILLARY BLOOD GLUCOSE          RADIOLOGY & ADDITIONAL TESTS:    Imaging Personally Reviewed:  [x ] YES  [ ] NO    Consultant(s) Notes Reviewed:  [x ] YES  [ ] NO    Care Discussed with Consultants/Other Providers [x ] YES  [ ] NO

## 2020-03-16 PROCEDURE — 99232 SBSQ HOSP IP/OBS MODERATE 35: CPT

## 2020-03-16 PROCEDURE — 99231 SBSQ HOSP IP/OBS SF/LOW 25: CPT

## 2020-03-16 RX ORDER — OLANZAPINE 15 MG/1
5 TABLET, FILM COATED ORAL ONCE
Refills: 0 | Status: COMPLETED | OUTPATIENT
Start: 2020-03-16 | End: 2020-03-16

## 2020-03-16 RX ADMIN — OLANZAPINE 10 MILLIGRAM(S): 15 TABLET, FILM COATED ORAL at 21:10

## 2020-03-16 RX ADMIN — ENOXAPARIN SODIUM 40 MILLIGRAM(S): 100 INJECTION SUBCUTANEOUS at 13:26

## 2020-03-16 RX ADMIN — Medication 1 MILLIGRAM(S): at 11:17

## 2020-03-16 RX ADMIN — OLANZAPINE 10 MILLIGRAM(S): 15 TABLET, FILM COATED ORAL at 13:56

## 2020-03-16 RX ADMIN — Medication 1 MILLIGRAM(S): at 19:48

## 2020-03-16 RX ADMIN — Medication 1 MILLIGRAM(S): at 01:16

## 2020-03-16 RX ADMIN — OLANZAPINE 5 MILLIGRAM(S): 15 TABLET, FILM COATED ORAL at 01:30

## 2020-03-16 RX ADMIN — Medication 2 MILLIGRAM(S): at 03:24

## 2020-03-16 NOTE — PROGRESS NOTE BEHAVIORAL HEALTH - NSBHCHARTREVIEWVS_PSY_A_CORE FT
Vital Signs Last 24 Hrs  T(C): 37.1 (16 Mar 2020 08:16), Max: 37.5 (15 Mar 2020 19:02)  T(F): 98.7 (16 Mar 2020 08:16), Max: 99.5 (15 Mar 2020 19:02)  HR: 78 (16 Mar 2020 08:16) (60 - 79)  BP: 165/77 (16 Mar 2020 08:16) (134/72 - 165/77)  BP(mean): --  RR: 18 (16 Mar 2020 08:16) (18 - 18)  SpO2: 91% (16 Mar 2020 08:16) (90% - 93%)

## 2020-03-16 NOTE — PROGRESS NOTE BEHAVIORAL HEALTH - NSBHCHARTREVIEWLAB_PSY_A_CORE FT
16.6   10.70 )-----------( 204      ( 15 Mar 2020 05:42 )             49.9   03-15    142  |  106  |  23  ----------------------------<  90  3.6   |  29  |  1.10    Ca    10.4<H>      15 Mar 2020 05:42

## 2020-03-16 NOTE — PROGRESS NOTE ADULT - SUBJECTIVE AND OBJECTIVE BOX
Patient is a 75y old  Male who presents with a chief complaint of Confusion (15 Mar 2020 14:47)      INTERVAL HPI/OVERNIGHT EVENTS: The patient is a 75 yo male with questionable pmhx of questionable dementia(undiagnosed) per son Orville who presents with altered mental status admitted for confusion. pt is sleeping , not agitated today.     MEDICATIONS  (STANDING):  enoxaparin Injectable 40 milliGRAM(s) SubCutaneous daily  risperiDONE   Tablet 1 milliGRAM(s) Oral two times a day    MEDICATIONS  (PRN):  LORazepam   Injectable 1 milliGRAM(s) IntraMuscular every 8 hours PRN Agitation  OLANZapine Injectable 10 milliGRAM(s) IntraMuscular every 6 hours PRN acute agitation      Allergies    No Known Allergies    Intolerances        REVIEW OF SYSTEMS:  UTO , confused     Vital Signs Last 24 Hrs  T(C): 37.4 (16 Mar 2020 13:25), Max: 37.5 (15 Mar 2020 19:02)  T(F): 99.4 (16 Mar 2020 13:25), Max: 99.5 (15 Mar 2020 19:02)  HR: 67 (16 Mar 2020 13:25) (60 - 79)  BP: 134/77 (16 Mar 2020 13:25) (134/72 - 165/77)  BP(mean): --  RR: 18 (16 Mar 2020 13:25) (18 - 18)  SpO2: 92% (16 Mar 2020 13:25) (90% - 93%)    PHYSICAL EXAM:  GENERAL: NAD, well-groomed, well-developed  HEAD:  Atraumatic, Normocephalic  EYES: EOMI, PERRLA, conjunctiva and sclera clear  ENMT: No tonsillar erythema, exudates, or enlargement; Moist mucous membranes, Good dentition, No lesions  NECK: Supple, No JVD, Normal thyroid  NERVOUS SYSTEM:  UTO , confused   CHEST/LUNG: Clear to auscultation bilaterally; No rales, rhonchi, wheezing, or rubs  HEART: Regular rate and rhythm; No murmurs, rubs, or gallops  ABDOMEN: Soft, Nontender, Nondistended; Bowel sounds present  EXTREMITIES:  2+ Peripheral Pulses, No clubbing, cyanosis, or edema  LYMPH: No lymphadenopathy noted  SKIN: No rashes or lesions    LABS:      Ca    10.4       15 Mar 2020 05:42          CAPILLARY BLOOD GLUCOSE          RADIOLOGY & ADDITIONAL TESTS:    Imaging Personally Reviewed:  [ ] YES  [ ] NO    Consultant(s) Notes Reviewed:  [ ] YES  [ ] NO    Care Discussed with Consultants/Other Providers [ ] YES  [ ] NO

## 2020-03-16 NOTE — PROGRESS NOTE BEHAVIORAL HEALTH - NSBHFUPINTERVALHXFT_PSY_A_CORE
Patient seen, evaluated and chart reviewed. Patient has reportedly been agitated and confused yesterday, requiring several PRNs. Patient is currently sleeping, not able to engage.

## 2020-03-17 LAB
ALBUMIN SERPL ELPH-MCNC: 3.1 G/DL — LOW (ref 3.3–5)
ALP SERPL-CCNC: 101 U/L — SIGNIFICANT CHANGE UP (ref 40–120)
ALT FLD-CCNC: 17 U/L — SIGNIFICANT CHANGE UP (ref 12–78)
ANION GAP SERPL CALC-SCNC: 7 MMOL/L — SIGNIFICANT CHANGE UP (ref 5–17)
ANION GAP SERPL CALC-SCNC: 9 MMOL/L — SIGNIFICANT CHANGE UP (ref 5–17)
AST SERPL-CCNC: 27 U/L — SIGNIFICANT CHANGE UP (ref 15–37)
BASOPHILS # BLD AUTO: 0.03 K/UL — SIGNIFICANT CHANGE UP (ref 0–0.2)
BASOPHILS NFR BLD AUTO: 0.3 % — SIGNIFICANT CHANGE UP (ref 0–2)
BILIRUB SERPL-MCNC: 1.8 MG/DL — HIGH (ref 0.2–1.2)
BUN SERPL-MCNC: 22 MG/DL — SIGNIFICANT CHANGE UP (ref 7–23)
BUN SERPL-MCNC: 23 MG/DL — SIGNIFICANT CHANGE UP (ref 7–23)
CALCIUM SERPL-MCNC: 11.1 MG/DL — HIGH (ref 8.5–10.1)
CALCIUM SERPL-MCNC: 11.2 MG/DL — HIGH (ref 8.5–10.1)
CHLORIDE SERPL-SCNC: 109 MMOL/L — HIGH (ref 96–108)
CHLORIDE SERPL-SCNC: 110 MMOL/L — HIGH (ref 96–108)
CO2 SERPL-SCNC: 26 MMOL/L — SIGNIFICANT CHANGE UP (ref 22–31)
CO2 SERPL-SCNC: 27 MMOL/L — SIGNIFICANT CHANGE UP (ref 22–31)
CREAT SERPL-MCNC: 1.1 MG/DL — SIGNIFICANT CHANGE UP (ref 0.5–1.3)
CREAT SERPL-MCNC: 1.1 MG/DL — SIGNIFICANT CHANGE UP (ref 0.5–1.3)
EOSINOPHIL # BLD AUTO: 0.26 K/UL — SIGNIFICANT CHANGE UP (ref 0–0.5)
EOSINOPHIL NFR BLD AUTO: 2.7 % — SIGNIFICANT CHANGE UP (ref 0–6)
GLUCOSE SERPL-MCNC: 81 MG/DL — SIGNIFICANT CHANGE UP (ref 70–99)
GLUCOSE SERPL-MCNC: 92 MG/DL — SIGNIFICANT CHANGE UP (ref 70–99)
HCT VFR BLD CALC: 48.5 % — SIGNIFICANT CHANGE UP (ref 39–50)
HGB BLD-MCNC: 16.4 G/DL — SIGNIFICANT CHANGE UP (ref 13–17)
IMM GRANULOCYTES NFR BLD AUTO: 0.5 % — SIGNIFICANT CHANGE UP (ref 0–1.5)
LYMPHOCYTES # BLD AUTO: 0.82 K/UL — LOW (ref 1–3.3)
LYMPHOCYTES # BLD AUTO: 8.5 % — LOW (ref 13–44)
MCHC RBC-ENTMCNC: 29.4 PG — SIGNIFICANT CHANGE UP (ref 27–34)
MCHC RBC-ENTMCNC: 33.8 GM/DL — SIGNIFICANT CHANGE UP (ref 32–36)
MCV RBC AUTO: 87.1 FL — SIGNIFICANT CHANGE UP (ref 80–100)
MONOCYTES # BLD AUTO: 0.75 K/UL — SIGNIFICANT CHANGE UP (ref 0–0.9)
MONOCYTES NFR BLD AUTO: 7.8 % — SIGNIFICANT CHANGE UP (ref 2–14)
NEUTROPHILS # BLD AUTO: 7.75 K/UL — HIGH (ref 1.8–7.4)
NEUTROPHILS NFR BLD AUTO: 80.2 % — HIGH (ref 43–77)
NRBC # BLD: 0 /100 WBCS — SIGNIFICANT CHANGE UP (ref 0–0)
PLATELET # BLD AUTO: 244 K/UL — SIGNIFICANT CHANGE UP (ref 150–400)
POTASSIUM SERPL-MCNC: 3.8 MMOL/L — SIGNIFICANT CHANGE UP (ref 3.5–5.3)
POTASSIUM SERPL-MCNC: 4.1 MMOL/L — SIGNIFICANT CHANGE UP (ref 3.5–5.3)
POTASSIUM SERPL-SCNC: 3.8 MMOL/L — SIGNIFICANT CHANGE UP (ref 3.5–5.3)
POTASSIUM SERPL-SCNC: 4.1 MMOL/L — SIGNIFICANT CHANGE UP (ref 3.5–5.3)
PROT SERPL-MCNC: 7.1 G/DL — SIGNIFICANT CHANGE UP (ref 6–8.3)
RBC # BLD: 5.57 M/UL — SIGNIFICANT CHANGE UP (ref 4.2–5.8)
RBC # FLD: 13.1 % — SIGNIFICANT CHANGE UP (ref 10.3–14.5)
SODIUM SERPL-SCNC: 144 MMOL/L — SIGNIFICANT CHANGE UP (ref 135–145)
SODIUM SERPL-SCNC: 144 MMOL/L — SIGNIFICANT CHANGE UP (ref 135–145)
WBC # BLD: 9.66 K/UL — SIGNIFICANT CHANGE UP (ref 3.8–10.5)
WBC # FLD AUTO: 9.66 K/UL — SIGNIFICANT CHANGE UP (ref 3.8–10.5)

## 2020-03-17 PROCEDURE — 99231 SBSQ HOSP IP/OBS SF/LOW 25: CPT

## 2020-03-17 PROCEDURE — 99232 SBSQ HOSP IP/OBS MODERATE 35: CPT

## 2020-03-17 RX ADMIN — Medication 1 MILLIGRAM(S): at 03:58

## 2020-03-17 RX ADMIN — Medication 1 MILLIGRAM(S): at 20:57

## 2020-03-17 RX ADMIN — OLANZAPINE 10 MILLIGRAM(S): 15 TABLET, FILM COATED ORAL at 18:37

## 2020-03-17 RX ADMIN — ENOXAPARIN SODIUM 40 MILLIGRAM(S): 100 INJECTION SUBCUTANEOUS at 11:55

## 2020-03-17 NOTE — PROGRESS NOTE BEHAVIORAL HEALTH - NSBHFUPINTERVALHXFT_PSY_A_CORE
Patient seen, evaluated and chart reviewed. Patient has reportedly been agitated and confused yesterday, requiring several PRNs again. Patient is currently lethargic, unable to engage, he is in two point restraints. As per medical team patient is medically stable. Spoke to son yesterday, the diagnosis of dementia is questionable, as there was no formal evaluation due to patient's unwillingness to see doctors. Unclear the level of baseline prior to admission.

## 2020-03-17 NOTE — PROGRESS NOTE BEHAVIORAL HEALTH - NSBHCHARTREVIEWVS_PSY_A_CORE FT
Vital Signs Last 24 Hrs  T(C): 36.5 (17 Mar 2020 06:29), Max: 37.4 (16 Mar 2020 13:25)  T(F): 97.7 (17 Mar 2020 06:29), Max: 99.4 (16 Mar 2020 13:25)  HR: 75 (17 Mar 2020 06:29) (67 - 75)  BP: 153/78 (17 Mar 2020 06:29) (134/77 - 153/78)  BP(mean): --  RR: 18 (17 Mar 2020 06:29) (18 - 18)  SpO2: 90% (17 Mar 2020 06:29) (90% - 92%)

## 2020-03-17 NOTE — PROGRESS NOTE ADULT - SUBJECTIVE AND OBJECTIVE BOX
Patient is a 75y old  Male who presents with a chief complaint of Confusion (16 Mar 2020 13:27)      INTERVAL HPI/OVERNIGHT EVENTS: The patient is a 73 yo male with questionable pmhx of questionable dementia(undiagnosed) per son Orville who presents with altered mental status admitted for confusion.    MEDICATIONS  (STANDING):  enoxaparin Injectable 40 milliGRAM(s) SubCutaneous daily  risperiDONE   Tablet 1 milliGRAM(s) Oral two times a day    MEDICATIONS  (PRN):  LORazepam   Injectable 1 milliGRAM(s) IV Push every 8 hours PRN Agitation  OLANZapine Injectable 10 milliGRAM(s) IntraMuscular every 6 hours PRN acute agitation      Allergies    No Known Allergies    Intolerances          Vital Signs Last 24 Hrs  T(C): 36.4 (17 Mar 2020 12:46), Max: 36.5 (17 Mar 2020 06:29)  T(F): 97.6 (17 Mar 2020 12:46), Max: 97.7 (17 Mar 2020 06:29)  HR: 69 (17 Mar 2020 12:46) (69 - 75)  BP: 131/77 (17 Mar 2020 12:46) (131/77 - 153/78)  BP(mean): --  RR: 18 (17 Mar 2020 12:46) (18 - 18)  SpO2: 91% (17 Mar 2020 12:46) (90% - 91%)    PHYSICAL EXAM:  GENERAL: NAD, well-groomed, well-developed  HEAD:  Atraumatic, Normocephalic  EYES: EOMI, PERRLA, conjunctiva and sclera clear  ENMT: No tonsillar erythema, exudates, or enlargement; Moist mucous membranes, Good dentition, No lesions  NECK: Supple, No JVD, Normal thyroid  NERVOUS SYSTEM: UTO, confused   CHEST/LUNG: Clear to auscultation bilaterally; No rales, rhonchi, wheezing, or rubs  HEART: Regular rate and rhythm; No murmurs, rubs, or gallops  ABDOMEN: Soft, Nontender, Nondistended; Bowel sounds present  EXTREMITIES:  2+ Peripheral Pulses, No clubbing, cyanosis, or edema  LYMPH: No lymphadenopathy noted  SKIN: No rashes or lesions    LABS:                        16.4   9.66  )-----------( 244      ( 17 Mar 2020 10:25 )             48.5     17 Mar 2020 10:25    144    |  110    |  23     ----------------------------<  92     4.1     |  27     |  1.10     Ca    11.1       17 Mar 2020 10:25    TPro  7.1    /  Alb  3.1    /  TBili  1.8    /  DBili  x      /  AST  27     /  ALT  17     /  AlkPhos  101    17 Mar 2020 10:25        CAPILLARY BLOOD GLUCOSE          RADIOLOGY & ADDITIONAL TESTS:    Imaging Personally Reviewed:  [ x] YES  [ ] NO    Consultant(s) Notes Reviewed:  [x ] YES  [ ] NO    Care Discussed with Consultants/Other Providers [x ] YES  [ ] NO

## 2020-03-17 NOTE — DISCHARGE NOTE PROVIDER - CARE PROVIDER_API CALL
Sue Xiao)  Psychiatry  [address] [place] [address]  Phone: [phone]  Fax: [phone]  Follow Up Time: Routine
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
## 2020-03-17 NOTE — DISCHARGE NOTE PROVIDER - NSDCCPCAREPLAN_GEN_ALL_CORE_FT
PRINCIPAL DISCHARGE DIAGNOSIS  Diagnosis: Confusion  Assessment and Plan of Treatment: -Possibly worsening dementia vs. delirium  -Continue treatment as per Psych recs      SECONDARY DISCHARGE DIAGNOSES  Diagnosis: Primary hyperparathyroidism  Assessment and Plan of Treatment: -Received Aredia IV x1  -Hypercalcemia resolved  -Follow up with PCP with 1 month of discharge

## 2020-03-18 LAB
APPEARANCE UR: CLEAR — SIGNIFICANT CHANGE UP
BACTERIA # UR AUTO: ABNORMAL
BILIRUB UR-MCNC: ABNORMAL
CALCIUM SERPL-MCNC: 11.2 MG/DL — HIGH (ref 8.4–10.5)
COLOR SPEC: YELLOW — SIGNIFICANT CHANGE UP
COMMENT - URINE: SIGNIFICANT CHANGE UP
DIFF PNL FLD: NEGATIVE — SIGNIFICANT CHANGE UP
EPI CELLS # UR: SIGNIFICANT CHANGE UP
GLUCOSE UR QL: NEGATIVE — SIGNIFICANT CHANGE UP
HYALINE CASTS # UR AUTO: ABNORMAL /LPF
KETONES UR-MCNC: ABNORMAL
LEUKOCYTE ESTERASE UR-ACNC: NEGATIVE — SIGNIFICANT CHANGE UP
LIDOCAIN IGE QN: 51 U/L — LOW (ref 73–393)
NITRITE UR-MCNC: NEGATIVE — SIGNIFICANT CHANGE UP
PCP SPEC-MCNC: SIGNIFICANT CHANGE UP
PH UR: 5 — SIGNIFICANT CHANGE UP (ref 5–8)
PROT UR-MCNC: 15
RBC CASTS # UR COMP ASSIST: SIGNIFICANT CHANGE UP /HPF (ref 0–4)
SP GR SPEC: 1.02 — SIGNIFICANT CHANGE UP (ref 1.01–1.02)
UROBILINOGEN FLD QL: 4
WBC UR QL: SIGNIFICANT CHANGE UP

## 2020-03-18 PROCEDURE — 99232 SBSQ HOSP IP/OBS MODERATE 35: CPT

## 2020-03-18 RX ORDER — SODIUM CHLORIDE 9 MG/ML
1000 INJECTION INTRAMUSCULAR; INTRAVENOUS; SUBCUTANEOUS
Refills: 0 | Status: DISCONTINUED | OUTPATIENT
Start: 2020-03-18 | End: 2020-03-19

## 2020-03-18 RX ADMIN — ENOXAPARIN SODIUM 40 MILLIGRAM(S): 100 INJECTION SUBCUTANEOUS at 14:07

## 2020-03-18 RX ADMIN — Medication 1 MILLIGRAM(S): at 22:50

## 2020-03-18 RX ADMIN — Medication 1 MILLIGRAM(S): at 05:54

## 2020-03-18 RX ADMIN — OLANZAPINE 10 MILLIGRAM(S): 15 TABLET, FILM COATED ORAL at 15:00

## 2020-03-18 RX ADMIN — SODIUM CHLORIDE 75 MILLILITER(S): 9 INJECTION INTRAMUSCULAR; INTRAVENOUS; SUBCUTANEOUS at 14:33

## 2020-03-18 RX ADMIN — OLANZAPINE 10 MILLIGRAM(S): 15 TABLET, FILM COATED ORAL at 00:49

## 2020-03-18 RX ADMIN — SODIUM CHLORIDE 75 MILLILITER(S): 9 INJECTION INTRAMUSCULAR; INTRAVENOUS; SUBCUTANEOUS at 21:05

## 2020-03-18 RX ADMIN — Medication 1 MILLIGRAM(S): at 18:43

## 2020-03-18 NOTE — PROGRESS NOTE ADULT - PROBLEM SELECTOR PLAN 3
- 11.1 yesterday  - possibly 2/2 to dehydration  - will check parathyroid hormone as hypercalcemia could cause AMS but usually at higher levels  - f/u am CMP - 11.1 yesterday  - possibly 2/2 to dehydration  - will check parathyroid hormone as hypercalcemia could cause AMS but usually at higher levels  -IVF for 24 hours  - f/u am CMP

## 2020-03-18 NOTE — PROGRESS NOTE ADULT - PROBLEM SELECTOR PLAN 1
likely due to dementia with behavioral disturbance  - questionable hx of dementia as son admits symptoms have been progressive x2 year  - CT head involutional changes with volume loss. No acute abnormality suggested  - CxR no acute finding  - F/u UA,  - Given: Ativan 1mg x2, Ativan 1mg, Haldol 5mg x1  - Psych Dr. Xiao consulted  - Social work following as pt's family now considering assisted living/nursing home  spoke to Dr Xiao , recommended to start him on Risperdal , continue IV ativan and keep zyprexa as needed IM, pt is very agitated and combative.   plan is to discharge him to inpatient psych.  2 PC signed , waiting for Dr Xiao to sign and final determination- awaiting UA- collected today- no signs of infection noted

## 2020-03-19 LAB
ALBUMIN SERPL ELPH-MCNC: 3.1 G/DL — LOW (ref 3.3–5)
ALP SERPL-CCNC: 95 U/L — SIGNIFICANT CHANGE UP (ref 40–120)
ALT FLD-CCNC: 22 U/L — SIGNIFICANT CHANGE UP (ref 12–78)
ANION GAP SERPL CALC-SCNC: 7 MMOL/L — SIGNIFICANT CHANGE UP (ref 5–17)
AST SERPL-CCNC: 29 U/L — SIGNIFICANT CHANGE UP (ref 15–37)
BILIRUB SERPL-MCNC: 1.5 MG/DL — HIGH (ref 0.2–1.2)
BUN SERPL-MCNC: 19 MG/DL — SIGNIFICANT CHANGE UP (ref 7–23)
CALCIUM SERPL-MCNC: 10.9 MG/DL — HIGH (ref 8.5–10.1)
CHLORIDE SERPL-SCNC: 111 MMOL/L — HIGH (ref 96–108)
CO2 SERPL-SCNC: 29 MMOL/L — SIGNIFICANT CHANGE UP (ref 22–31)
CREAT SERPL-MCNC: 1.1 MG/DL — SIGNIFICANT CHANGE UP (ref 0.5–1.3)
CULTURE RESULTS: NO GROWTH — SIGNIFICANT CHANGE UP
GLUCOSE SERPL-MCNC: 81 MG/DL — SIGNIFICANT CHANGE UP (ref 70–99)
HCT VFR BLD CALC: 49.7 % — SIGNIFICANT CHANGE UP (ref 39–50)
HGB BLD-MCNC: 16.5 G/DL — SIGNIFICANT CHANGE UP (ref 13–17)
MCHC RBC-ENTMCNC: 29.4 PG — SIGNIFICANT CHANGE UP (ref 27–34)
MCHC RBC-ENTMCNC: 33.2 GM/DL — SIGNIFICANT CHANGE UP (ref 32–36)
MCV RBC AUTO: 88.4 FL — SIGNIFICANT CHANGE UP (ref 80–100)
NRBC # BLD: 0 /100 WBCS — SIGNIFICANT CHANGE UP (ref 0–0)
PLATELET # BLD AUTO: 238 K/UL — SIGNIFICANT CHANGE UP (ref 150–400)
POTASSIUM SERPL-MCNC: 3.7 MMOL/L — SIGNIFICANT CHANGE UP (ref 3.5–5.3)
POTASSIUM SERPL-SCNC: 3.7 MMOL/L — SIGNIFICANT CHANGE UP (ref 3.5–5.3)
PROT SERPL-MCNC: 7 G/DL — SIGNIFICANT CHANGE UP (ref 6–8.3)
PTH-INTACT FLD-MCNC: 80 PG/ML — HIGH (ref 15–65)
RBC # BLD: 5.62 M/UL — SIGNIFICANT CHANGE UP (ref 4.2–5.8)
RBC # FLD: 13.1 % — SIGNIFICANT CHANGE UP (ref 10.3–14.5)
SODIUM SERPL-SCNC: 147 MMOL/L — HIGH (ref 135–145)
SPECIMEN SOURCE: SIGNIFICANT CHANGE UP
WBC # BLD: 7.8 K/UL — SIGNIFICANT CHANGE UP (ref 3.8–10.5)
WBC # FLD AUTO: 7.8 K/UL — SIGNIFICANT CHANGE UP (ref 3.8–10.5)

## 2020-03-19 PROCEDURE — 99232 SBSQ HOSP IP/OBS MODERATE 35: CPT

## 2020-03-19 RX ORDER — CINACALCET 30 MG/1
30 TABLET, FILM COATED ORAL DAILY
Refills: 0 | Status: DISCONTINUED | OUTPATIENT
Start: 2020-03-19 | End: 2020-03-20

## 2020-03-19 RX ADMIN — Medication 1 MILLIGRAM(S): at 17:09

## 2020-03-19 RX ADMIN — OLANZAPINE 10 MILLIGRAM(S): 15 TABLET, FILM COATED ORAL at 08:59

## 2020-03-19 RX ADMIN — OLANZAPINE 10 MILLIGRAM(S): 15 TABLET, FILM COATED ORAL at 22:16

## 2020-03-19 RX ADMIN — OLANZAPINE 10 MILLIGRAM(S): 15 TABLET, FILM COATED ORAL at 00:05
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
## 2020-03-19 NOTE — DIETITIAN INITIAL EVALUATION ADULT. - ENERGY NEEDS
Wt: 215 pounds, Ht: 74 inches, BMI: 27.6 kg/m2, IBW: 190 pounds +/-10%, %IBW: 113%  +1 scrotal edema
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
## 2020-03-19 NOTE — DIETITIAN INITIAL EVALUATION ADULT. - ADD RECOMMEND
Add Ensure Enlive BID (chocolate flavor). Encourage po intake and spoonfeed patient. RD to provide food preferences. Consider SLP evaluation given suspected dysphagia and coughing with liquids. MD De Leon made aware nutrition recommendations and pending verification placed.

## 2020-03-19 NOTE — PROGRESS NOTE ADULT - PROBLEM SELECTOR PLAN 3
- 11.1 yesterday  - possibly 2/2 to dehydration  - will check parathyroid hormone as hypercalcemia could cause AMS but usually at higher levels  - Patient with elevated PTH- likely with primary hyperparathyroidism- Endo consulted, will start on cinacalcet  - f/u am CMP

## 2020-03-19 NOTE — DIETITIAN INITIAL EVALUATION ADULT. - PHYSICAL APPEARANCE
BMI: 27.6 kg/m2/other (specify)/well nourished Appears well nourished. unable to perform nutrition focused physical exam at this time.

## 2020-03-20 DIAGNOSIS — E21.0 PRIMARY HYPERPARATHYROIDISM: ICD-10-CM

## 2020-03-20 PROCEDURE — 99232 SBSQ HOSP IP/OBS MODERATE 35: CPT

## 2020-03-20 RX ORDER — OLANZAPINE 15 MG/1
5 TABLET, FILM COATED ORAL
Refills: 0 | Status: DISCONTINUED | OUTPATIENT
Start: 2020-03-20 | End: 2020-03-24

## 2020-03-20 RX ORDER — PAMIDRONATE DISODIUM 9 MG/ML
60 INJECTION, SOLUTION INTRAVENOUS ONCE
Refills: 0 | Status: COMPLETED | OUTPATIENT
Start: 2020-03-20 | End: 2020-03-20

## 2020-03-20 RX ADMIN — OLANZAPINE 10 MILLIGRAM(S): 15 TABLET, FILM COATED ORAL at 13:47

## 2020-03-20 RX ADMIN — Medication 2 MILLIGRAM(S): at 13:20

## 2020-03-20 RX ADMIN — PAMIDRONATE DISODIUM 65 MILLIGRAM(S): 9 INJECTION, SOLUTION INTRAVENOUS at 10:13

## 2020-03-20 NOTE — PROGRESS NOTE BEHAVIORAL HEALTH - NSBHCONSULTFOLLOWDETAILS_PSY_A_CORE FT
At this time the discharge options are to be considered
Patient will be transferred to an inpatient psychiatric unit once medically stable.

## 2020-03-20 NOTE — PROGRESS NOTE BEHAVIORAL HEALTH - NSBHCHARTREVIEWLAB_PSY_A_CORE FT
16.5   7.80  )-----------( 238      ( 19 Mar 2020 08:33 )             49.7   03-19    147<H>  |  111<H>  |  19  ----------------------------<  81  3.7   |  29  |  1.10    Ca    10.9<H>      19 Mar 2020 08:33    TPro  7.0  /  Alb  3.1<L>  /  TBili  1.5<H>  /  DBili  x   /  AST  29  /  ALT  22  /  AlkPhos  95  03-19
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
## 2020-03-20 NOTE — PROGRESS NOTE ADULT - PROBLEM SELECTOR PLAN 1
likely due to dementia with behavioral disturbance  - questionable hx of dementia as son admits symptoms have been progressive x2 year  - CT head involutional changes with volume loss. No acute abnormality suggested  - CxR no acute finding  - F/u UA, urine cx- neg  - Given: Ativan 1mg x2, Ativan 1mg, Haldol 5mg x1  - Psych Dr. Xiao consulted  - Social work following as pt's family now considering assisted living/nursing home  spoke to Dr Xiao , recommended to start him on Risperdal , continue IV ativan and keep zyprexa as needed IM, pt is very agitated and combative.   plan is to discharge him to inpatient psych vs. back home as per son's wishes

## 2020-03-20 NOTE — PROGRESS NOTE ADULT - PROBLEM SELECTOR PLAN 3
- will check parathyroid hormone as hypercalcemia could cause AMS but usually at higher levels  - Patient with elevated PTH- likely with primary hyperparathyroidism- Endo consulted, will start on cinacalcet- patient refusing, given 1x dose of aredia as per endo  - f/u am CMP

## 2020-03-20 NOTE — PROGRESS NOTE BEHAVIORAL HEALTH - NSBHFUPINTERVALHXFT_PSY_A_CORE
Patient seen, evaluated and chart reviewed. Patient has reportedly been agitated and confused today requiring several PRNs again. Patient is in two point restraints. As per medical team patient is medically stable. Spoke to son who at this time is considering taking the patient home. Patient is currently more lucid than before, and he is minimally confused.

## 2020-03-20 NOTE — PROGRESS NOTE ADULT - SUBJECTIVE AND OBJECTIVE BOX
INTERVAL HPI/OVERNIGHT EVENTS:  Patient seen and examined at bedside.  Patient sleeping, but as per RN reports, still agitated, aggressive.    ROS: All other review of systems is negative unless indicated above.      MEDICATIONS  (STANDING):  enoxaparin Injectable 40 milliGRAM(s) SubCutaneous daily  OLANZapine Disintegrating Tablet 5 milliGRAM(s) Oral two times a day    MEDICATIONS  (PRN):  LORazepam   Injectable 1 milliGRAM(s) IV Push every 8 hours PRN Agitation  OLANZapine Injectable 10 milliGRAM(s) IntraMuscular every 6 hours PRN acute agitation      Allergies    No Known Allergies    Intolerances      Vital Signs Last 24 Hrs  T(C): 37.3 (20 Mar 2020 06:45), Max: 37.3 (20 Mar 2020 06:45)  T(F): 99.1 (20 Mar 2020 06:45), Max: 99.1 (20 Mar 2020 06:45)  HR: 62 (20 Mar 2020 06:45) (62 - 68)  BP: 114/65 (20 Mar 2020 06:45) (114/65 - 139/71)  BP(mean): --  RR: 19 (20 Mar 2020 06:45) (18 - 19)  SpO2: 92% (20 Mar 2020 06:45) (92% - 94%)    03-19 @ 07:01  -  03-20 @ 07:00  --------------------------------------------------------  IN: 440 mL / OUT: 2 mL / NET: 438 mL    03-20 @ 07:01 - 03-20 @ 17:31  --------------------------------------------------------  IN: 250 mL / OUT: 0 mL / NET: 250 mL      Physical Exam:  General: WN/WD NAD  Neurology: A&Ox3, nonfocal, TERRELL x 4  Respiratory: CTA B/L  CV: RRR, S1S2  Abdominal: Soft, NT, ND +BS  Extremities: No edema, + peripheral pulses      LABS:                        16.5   7.80  )-----------( 238      ( 19 Mar 2020 08:33 )             49.7     03-19    147<H>  |  111<H>  |  19  ----------------------------<  81  3.7   |  29  |  1.10    Ca    10.9<H>      19 Mar 2020 08:33    TPro  7.0  /  Alb  3.1<L>  /  TBili  1.5<H>  /  DBili  x   /  AST  29  /  ALT  22  /  AlkPhos  95  03-19          RADIOLOGY & ADDITIONAL TESTS:
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
## 2020-03-20 NOTE — PROGRESS NOTE BEHAVIORAL HEALTH - NSBHCHARTREVIEWVS_PSY_A_CORE FT
Vital Signs Last 24 Hrs  T(C): 37.3 (20 Mar 2020 06:45), Max: 37.3 (20 Mar 2020 06:45)  T(F): 99.1 (20 Mar 2020 06:45), Max: 99.1 (20 Mar 2020 06:45)  HR: 62 (20 Mar 2020 06:45) (62 - 68)  BP: 114/65 (20 Mar 2020 06:45) (114/65 - 139/71)  BP(mean): --  RR: 19 (20 Mar 2020 06:45) (18 - 19)  SpO2: 92% (20 Mar 2020 06:45) (92% - 94%)

## 2020-03-21 LAB
ALBUMIN SERPL ELPH-MCNC: 3.1 G/DL — LOW (ref 3.3–5)
ALP SERPL-CCNC: 85 U/L — SIGNIFICANT CHANGE UP (ref 40–120)
ALT FLD-CCNC: 26 U/L — SIGNIFICANT CHANGE UP (ref 12–78)
ANION GAP SERPL CALC-SCNC: 7 MMOL/L — SIGNIFICANT CHANGE UP (ref 5–17)
AST SERPL-CCNC: 34 U/L — SIGNIFICANT CHANGE UP (ref 15–37)
BILIRUB SERPL-MCNC: 1.2 MG/DL — SIGNIFICANT CHANGE UP (ref 0.2–1.2)
BUN SERPL-MCNC: 18 MG/DL — SIGNIFICANT CHANGE UP (ref 7–23)
CALCIUM SERPL-MCNC: 10.1 MG/DL — SIGNIFICANT CHANGE UP (ref 8.5–10.1)
CHLORIDE SERPL-SCNC: 107 MMOL/L — SIGNIFICANT CHANGE UP (ref 96–108)
CO2 SERPL-SCNC: 31 MMOL/L — SIGNIFICANT CHANGE UP (ref 22–31)
CREAT SERPL-MCNC: 1.1 MG/DL — SIGNIFICANT CHANGE UP (ref 0.5–1.3)
GLUCOSE SERPL-MCNC: 98 MG/DL — SIGNIFICANT CHANGE UP (ref 70–99)
HCT VFR BLD CALC: 46.3 % — SIGNIFICANT CHANGE UP (ref 39–50)
HGB BLD-MCNC: 15.9 G/DL — SIGNIFICANT CHANGE UP (ref 13–17)
MCHC RBC-ENTMCNC: 30 PG — SIGNIFICANT CHANGE UP (ref 27–34)
MCHC RBC-ENTMCNC: 34.3 GM/DL — SIGNIFICANT CHANGE UP (ref 32–36)
MCV RBC AUTO: 87.4 FL — SIGNIFICANT CHANGE UP (ref 80–100)
NRBC # BLD: 0 /100 WBCS — SIGNIFICANT CHANGE UP (ref 0–0)
PLATELET # BLD AUTO: 241 K/UL — SIGNIFICANT CHANGE UP (ref 150–400)
POTASSIUM SERPL-MCNC: 3.5 MMOL/L — SIGNIFICANT CHANGE UP (ref 3.5–5.3)
POTASSIUM SERPL-SCNC: 3.5 MMOL/L — SIGNIFICANT CHANGE UP (ref 3.5–5.3)
PROT SERPL-MCNC: 6.8 G/DL — SIGNIFICANT CHANGE UP (ref 6–8.3)
RBC # BLD: 5.3 M/UL — SIGNIFICANT CHANGE UP (ref 4.2–5.8)
RBC # FLD: 12.8 % — SIGNIFICANT CHANGE UP (ref 10.3–14.5)
SODIUM SERPL-SCNC: 145 MMOL/L — SIGNIFICANT CHANGE UP (ref 135–145)
WBC # BLD: 8.32 K/UL — SIGNIFICANT CHANGE UP (ref 3.8–10.5)
WBC # FLD AUTO: 8.32 K/UL — SIGNIFICANT CHANGE UP (ref 3.8–10.5)

## 2020-03-21 PROCEDURE — 99232 SBSQ HOSP IP/OBS MODERATE 35: CPT

## 2020-03-21 NOTE — PROGRESS NOTE ADULT - SUBJECTIVE AND OBJECTIVE BOX
INTERVAL HPI/OVERNIGHT EVENTS: Patient seen and examined at bedside.  Patient lethargic, denies any complaints, still gets agitated at times.    ROS: All other review of systems is negative unless indicated above.    MEDICATIONS  (STANDING):  enoxaparin Injectable 40 milliGRAM(s) SubCutaneous daily  OLANZapine Disintegrating Tablet 5 milliGRAM(s) Oral two times a day    MEDICATIONS  (PRN):  LORazepam   Injectable 1 milliGRAM(s) IV Push every 8 hours PRN Agitation  OLANZapine Injectable 10 milliGRAM(s) IntraMuscular every 6 hours PRN acute agitation      Allergies    No Known Allergies    Intolerances      Vital Signs Last 24 Hrs  T(C): 36.9 (21 Mar 2020 12:56), Max: 37.6 (20 Mar 2020 21:23)  T(F): 98.5 (21 Mar 2020 12:56), Max: 99.6 (20 Mar 2020 21:23)  HR: 84 (21 Mar 2020 12:56) (61 - 84)  BP: 164/80 (21 Mar 2020 12:56) (108/63 - 164/80)  BP(mean): --  RR: 18 (21 Mar 2020 12:56) (17 - 18)  SpO2: 93% (21 Mar 2020 12:56) (93% - 96%)    03-20 @ 07:01  -  03-21 @ 07:00  --------------------------------------------------------  IN: 350 mL / OUT: 0 mL / NET: 350 mL        Physical Exam:  General: WN/WD NAD  Neurology: A&Ox2, nonfocal, TERRELL x 4  Respiratory: CTA B/L  CV: RRR, S1S2  Abdominal: Soft, NT, ND +BS  Extremities: No edema, + peripheral pulses    LABS:                        15.9   8.32  )-----------( 241      ( 21 Mar 2020 05:48 )             46.3     03-21    145  |  107  |  18  ----------------------------<  98  3.5   |  31  |  1.10    Ca    10.1      21 Mar 2020 05:48    TPro  6.8  /  Alb  3.1<L>  /  TBili  1.2  /  DBili  x   /  AST  34  /  ALT  26  /  AlkPhos  85  03-21          RADIOLOGY & ADDITIONAL TESTS:

## 2020-03-21 NOTE — PROGRESS NOTE ADULT - PROBLEM SELECTOR PLAN 1
likely due to dementia with behavioral disturbance  - questionable hx of dementia as son admits symptoms have been progressive x2 years  - CT head involutional changes with volume loss. No acute abnormality suggested  - CxR no acute finding  - F/u UA, urine cx- neg  - Given: Ativan 1mg x2, Ativan 1mg, Haldol 5mg x1  - Psych Dr. Xiao consulted  - Social work following as pt's family now considering assisted living/nursing home  spoke to Dr Xiao , recommended to start him on Risperdal (switched to PO zyprexa) , continue IV ativan and keep zyprexa as needed IM, pt is very agitated and combative.   plan is to discharge him to inpatient psych vs. back home as per son's wishes

## 2020-03-21 NOTE — PROGRESS NOTE ADULT - PROBLEM SELECTOR PLAN 3
- will check parathyroid hormone as hypercalcemia could cause AMS but usually at higher levels  - Patient with elevated PTH- likely with primary hyperparathyroidism- Endo consulted, will start on cinacalcet- patient refusing, given 1x dose of aredia as per endo  - f/u am CMP- hypercalcemia improved

## 2020-03-22 LAB
ANION GAP SERPL CALC-SCNC: 7 MMOL/L — SIGNIFICANT CHANGE UP (ref 5–17)
BUN SERPL-MCNC: 20 MG/DL — SIGNIFICANT CHANGE UP (ref 7–23)
CALCIUM SERPL-MCNC: 10 MG/DL — SIGNIFICANT CHANGE UP (ref 8.5–10.1)
CHLORIDE SERPL-SCNC: 106 MMOL/L — SIGNIFICANT CHANGE UP (ref 96–108)
CO2 SERPL-SCNC: 28 MMOL/L — SIGNIFICANT CHANGE UP (ref 22–31)
CREAT SERPL-MCNC: 1.2 MG/DL — SIGNIFICANT CHANGE UP (ref 0.5–1.3)
GLUCOSE SERPL-MCNC: 88 MG/DL — SIGNIFICANT CHANGE UP (ref 70–99)
HCT VFR BLD CALC: 46.3 % — SIGNIFICANT CHANGE UP (ref 39–50)
HGB BLD-MCNC: 15.7 G/DL — SIGNIFICANT CHANGE UP (ref 13–17)
MCHC RBC-ENTMCNC: 29.6 PG — SIGNIFICANT CHANGE UP (ref 27–34)
MCHC RBC-ENTMCNC: 33.9 GM/DL — SIGNIFICANT CHANGE UP (ref 32–36)
MCV RBC AUTO: 87.4 FL — SIGNIFICANT CHANGE UP (ref 80–100)
NRBC # BLD: 0 /100 WBCS — SIGNIFICANT CHANGE UP (ref 0–0)
PLATELET # BLD AUTO: 207 K/UL — SIGNIFICANT CHANGE UP (ref 150–400)
POTASSIUM SERPL-MCNC: 3.5 MMOL/L — SIGNIFICANT CHANGE UP (ref 3.5–5.3)
POTASSIUM SERPL-SCNC: 3.5 MMOL/L — SIGNIFICANT CHANGE UP (ref 3.5–5.3)
RBC # BLD: 5.3 M/UL — SIGNIFICANT CHANGE UP (ref 4.2–5.8)
RBC # FLD: 13.1 % — SIGNIFICANT CHANGE UP (ref 10.3–14.5)
SODIUM SERPL-SCNC: 141 MMOL/L — SIGNIFICANT CHANGE UP (ref 135–145)
WBC # BLD: 5.68 K/UL — SIGNIFICANT CHANGE UP (ref 3.8–10.5)
WBC # FLD AUTO: 5.68 K/UL — SIGNIFICANT CHANGE UP (ref 3.8–10.5)

## 2020-03-22 PROCEDURE — 99232 SBSQ HOSP IP/OBS MODERATE 35: CPT

## 2020-03-22 RX ADMIN — Medication 200 MILLIGRAM(S): at 02:35

## 2020-03-22 RX ADMIN — OLANZAPINE 10 MILLIGRAM(S): 15 TABLET, FILM COATED ORAL at 11:21

## 2020-03-22 RX ADMIN — ENOXAPARIN SODIUM 40 MILLIGRAM(S): 100 INJECTION SUBCUTANEOUS at 11:12

## 2020-03-22 RX ADMIN — Medication 1 MILLIGRAM(S): at 12:53

## 2020-03-22 NOTE — PROGRESS NOTE ADULT - SUBJECTIVE AND OBJECTIVE BOX
Patient is a 75y old  Male who presents with a chief complaint of Confusion (21 Mar 2020 15:12)        INTERVAL HPI/OVERNIGHT EVENTS:    MEDICATIONS  (STANDING):  enoxaparin Injectable 40 milliGRAM(s) SubCutaneous daily  OLANZapine Disintegrating Tablet 5 milliGRAM(s) Oral two times a day    MEDICATIONS  (PRN):  LORazepam   Injectable 1 milliGRAM(s) IV Push every 8 hours PRN Agitation  OLANZapine Injectable 10 milliGRAM(s) IntraMuscular every 6 hours PRN acute agitation      Allergies    No Known Allergies    Intolerances          Vital Signs Last 24 Hrs  T(C): 37.1 (22 Mar 2020 05:13), Max: 37.1 (22 Mar 2020 05:13)  T(F): 98.7 (22 Mar 2020 05:13), Max: 98.7 (22 Mar 2020 05:13)  HR: 60 (22 Mar 2020 05:13) (60 - 84)  BP: 120/67 (22 Mar 2020 05:13) (120/67 - 164/80)  BP(mean): --  RR: 17 (22 Mar 2020 05:13) (17 - 19)  SpO2: 92% (22 Mar 2020 05:13) (92% - 96%)    General: WN/WD NAD  Respiratory: CTA B/L  CV: RRR, S1S2, no murmurs, rubs or gallops  Abdominal: Soft, NT, ND +BS, Last BM  Extremities: No edema, + peripheral pulses    LABS:                        15.7   5.68  )-----------( 207      ( 22 Mar 2020 06:56 )             46.3     03-22    141  |  106  |  20  ----------------------------<  88  3.5   |  28  |  1.20    Ca    10.0      22 Mar 2020 06:56    TPro  6.8  /  Alb  3.1<L>  /  TBili  1.2  /  DBili  x   /  AST  34  /  ALT  26  /  AlkPhos  85  03-21    CAPILLARY BLOOD GLUCOSE              RADIOLOGY & ADDITIONAL TESTS:

## 2020-03-22 NOTE — PROGRESS NOTE ADULT - SUBJECTIVE AND OBJECTIVE BOX
INTERVAL HPI/OVERNIGHT EVENTS:  Patient seen and examined at bedside.  Patient more cooperative earlier this AM, but began to be agitated and aggressive towards staff requiring zyprexa and ativan x1.    ROS: All other review of systems is negative unless indicated above.    MEDICATIONS  (STANDING):  enoxaparin Injectable 40 milliGRAM(s) SubCutaneous daily  OLANZapine Disintegrating Tablet 5 milliGRAM(s) Oral two times a day    MEDICATIONS  (PRN):  LORazepam   Injectable 1 milliGRAM(s) IV Push every 8 hours PRN Agitation  OLANZapine Injectable 10 milliGRAM(s) IntraMuscular every 6 hours PRN acute agitation      Allergies    No Known Allergies    Intolerances      Vital Signs Last 24 Hrs  T(C): 37.1 (22 Mar 2020 05:13), Max: 37.1 (22 Mar 2020 05:13)  T(F): 98.7 (22 Mar 2020 05:13), Max: 98.7 (22 Mar 2020 05:13)  HR: 73 (22 Mar 2020 12:46) (60 - 73)  BP: 114/73 (22 Mar 2020 12:46) (114/73 - 140/73)  BP(mean): --  RR: 18 (22 Mar 2020 12:46) (17 - 18)  SpO2: 93% (22 Mar 2020 12:46) (92% - 96%)    03-21 @ 07:01  -  03-22 @ 07:00  --------------------------------------------------------  IN: 480 mL / OUT: 0 mL / NET: 480 mL        Physical Exam:  General: WN/WD NAD  Neurology: A&Ox2, nonfocal, TERRELL x 4  Respiratory: CTA B/L  CV: RRR, S1S2  Abdominal: Soft, NT, ND +BS  Extremities: No edema, + peripheral pulses      LABS:                        15.7   5.68  )-----------( 207      ( 22 Mar 2020 06:56 )             46.3     03-22    141  |  106  |  20  ----------------------------<  88  3.5   |  28  |  1.20    Ca    10.0      22 Mar 2020 06:56    TPro  6.8  /  Alb  3.1<L>  /  TBili  1.2  /  DBili  x   /  AST  34  /  ALT  26  /  AlkPhos  85  03-21          RADIOLOGY & ADDITIONAL TESTS:

## 2020-03-22 NOTE — PROVIDER CONTACT NOTE (OTHER) - BACKGROUND
Pt on level one restaints with Constant observation. Pt on level one restaints with Constant observation. Zyprexa given for agitation at 1121 today.

## 2020-03-22 NOTE — PROGRESS NOTE ADULT - PROBLEM SELECTOR PLAN 3
- will check parathyroid hormone as hypercalcemia could cause AMS but usually at higher levels  - Patient with elevated PTH- likely with primary hyperparathyroidism- Endo consulted, will start on cinacalcet- patient refusing, given 1x dose of aredia as per endo  - f/u am CMP- hypercalcemia improved, downtrending

## 2020-03-23 LAB
ALBUMIN SERPL ELPH-MCNC: 3 G/DL — LOW (ref 3.3–5)
ALP SERPL-CCNC: 83 U/L — SIGNIFICANT CHANGE UP (ref 40–120)
ALT FLD-CCNC: 32 U/L — SIGNIFICANT CHANGE UP (ref 12–78)
ANION GAP SERPL CALC-SCNC: 8 MMOL/L — SIGNIFICANT CHANGE UP (ref 5–17)
AST SERPL-CCNC: 43 U/L — HIGH (ref 15–37)
BILIRUB SERPL-MCNC: 1 MG/DL — SIGNIFICANT CHANGE UP (ref 0.2–1.2)
BUN SERPL-MCNC: 20 MG/DL — SIGNIFICANT CHANGE UP (ref 7–23)
CALCIUM SERPL-MCNC: 9.4 MG/DL — SIGNIFICANT CHANGE UP (ref 8.5–10.1)
CHLORIDE SERPL-SCNC: 105 MMOL/L — SIGNIFICANT CHANGE UP (ref 96–108)
CO2 SERPL-SCNC: 28 MMOL/L — SIGNIFICANT CHANGE UP (ref 22–31)
CREAT SERPL-MCNC: 1 MG/DL — SIGNIFICANT CHANGE UP (ref 0.5–1.3)
GLUCOSE SERPL-MCNC: 87 MG/DL — SIGNIFICANT CHANGE UP (ref 70–99)
POTASSIUM SERPL-MCNC: 3.6 MMOL/L — SIGNIFICANT CHANGE UP (ref 3.5–5.3)
POTASSIUM SERPL-SCNC: 3.6 MMOL/L — SIGNIFICANT CHANGE UP (ref 3.5–5.3)
PROT SERPL-MCNC: 6.8 G/DL — SIGNIFICANT CHANGE UP (ref 6–8.3)
SODIUM SERPL-SCNC: 141 MMOL/L — SIGNIFICANT CHANGE UP (ref 135–145)

## 2020-03-23 PROCEDURE — 71045 X-RAY EXAM CHEST 1 VIEW: CPT | Mod: 26

## 2020-03-23 PROCEDURE — 99233 SBSQ HOSP IP/OBS HIGH 50: CPT

## 2020-03-23 RX ADMIN — Medication 1 MILLIGRAM(S): at 21:24

## 2020-03-23 RX ADMIN — Medication 2 MILLIGRAM(S): at 12:00

## 2020-03-23 RX ADMIN — OLANZAPINE 10 MILLIGRAM(S): 15 TABLET, FILM COATED ORAL at 11:46

## 2020-03-23 RX ADMIN — ENOXAPARIN SODIUM 40 MILLIGRAM(S): 100 INJECTION SUBCUTANEOUS at 13:14

## 2020-03-23 RX ADMIN — Medication 1 MILLIGRAM(S): at 11:39

## 2020-03-23 RX ADMIN — Medication 100 MILLIGRAM(S): at 21:18

## 2020-03-23 NOTE — PROVIDER CONTACT NOTE (OTHER) - ASSESSMENT
Pt agitated and became violent with staff. pt yelling and kicking and cursing, and spitting at staff. Pt refused VS to be taken. Pt refused to give back pulse ox cable. Pt kicked VS machine and broke whole machine. Pt screaming he wants to go home with no aide.
pt combative and violent with staff at this time. Security and staff in with pt. wrist restraints reapplied per orders.
pt noted to be coughing
upon VS at this time. B/P 111/71 pulse rate 66 temp 99.1 axillary , rr 20 pulse ox 88 % on RA. O2 nc applied at this time.
coughing intermittently

## 2020-03-23 NOTE — PROVIDER CONTACT NOTE (OTHER) - BACKGROUND
Code gray called on pt earlier .Pt ripped off wrist restraints and became combative and violent with staff. Ativan 3mg given per orders. Zyprexa given to pt per orders.

## 2020-03-23 NOTE — PROVIDER CONTACT NOTE (OTHER) - ACTION/TREATMENT ORDERED:
per resident md Carrillo ext [phone] she will pass on to Dr Carranza for cough syrup orders. No new orders.

## 2020-03-23 NOTE — PROVIDER CONTACT NOTE (OTHER) - BACKGROUND
level 1 wrist restraints  constant observation  Code [place] called this shift pt exhibits violent behavior.

## 2020-03-23 NOTE — PROGRESS NOTE ADULT - NSHPATTENDINGPLANDISCUSS_GEN_ALL_CORE
his son and Dr Xiao about his dementia.
patient, son, RN
patient, son, RN, Psych
patient, son, RN, Psych
family over the phone and SW about his discharge plan.
pt and nurses about his dementia
pt and nurses and Dr Xiao and called family and left message.
his son and Dr boswell about the plan of discharge

## 2020-03-23 NOTE — PROVIDER CONTACT NOTE (OTHER) - BACKGROUND
Pt on Constant observation and level one restraints. Code gray called on pt due to pt ripping off wrist restraints and becoming combative and violent with staff. Pt on Constant observation and level one restraints. Code gray called on pt due to pt ripping off wrist restraints and becoming combative and violent with staff. ativan 1mg  given at  1139 Pt on Constant observation and level one restraints. Code gray called on pt due to pt ripping off wrist restraints and becoming combative and violent with staff.    ativan 1mg  given at  1139

## 2020-03-23 NOTE — PROGRESS NOTE ADULT - SUBJECTIVE AND OBJECTIVE BOX
INTERVAL HPI/OVERNIGHT EVENTS:  Patient seen and examined at bedside.  Patient again cooperative earlier this AM, but had CODE GREY called for agitation in afternoon. In AM, patient was AAOx3, denied any CP, SOB, abd pain. Stated he wants to go home and wants to drive again. Explained ot patient given current agitation and dementia, it would be unsafe to discharge him. Explained to patient he would benefit from having an aide at home, patient angrily denies having someone at home to take care of him.    ROS: All other review of systems is negative unless indicated above.      MEDICATIONS  (STANDING):  enoxaparin Injectable 40 milliGRAM(s) SubCutaneous daily  OLANZapine Disintegrating Tablet 5 milliGRAM(s) Oral two times a day    MEDICATIONS  (PRN):  LORazepam   Injectable 1 milliGRAM(s) IV Push every 8 hours PRN Agitation  OLANZapine Injectable 10 milliGRAM(s) IntraMuscular every 6 hours PRN acute agitation      Allergies    No Known Allergies    Intolerances      Vital Signs Last 24 Hrs  T(C): 37.3 (23 Mar 2020 12:56), Max: 37.3 (23 Mar 2020 12:56)  T(F): 99.1 (23 Mar 2020 12:56), Max: 99.1 (23 Mar 2020 12:56)  HR: 66 (23 Mar 2020 12:56) (64 - 69)  BP: 111/71 (23 Mar 2020 12:56) (111/71 - 129/74)  BP(mean): --  RR: 18 (23 Mar 2020 12:56) (17 - 18)  SpO2: 88% (23 Mar 2020 12:56) (88% - 94%)    03-23 @ 07:01  -  03-23 @ 17:44  --------------------------------------------------------  IN: 0 mL / OUT: 50 mL / NET: -50 mL        Physical Exam:  General: WN/WD NAD  Neurology: A&Ox3, nonfocal, TERRELL x 4  Respiratory: CTA B/L  CV: RRR, S1S2  Abdominal: Soft, NT, ND +BS  Extremities: No edema, + peripheral pulses      LABS:                        15.7   5.68  )-----------( 207      ( 22 Mar 2020 06:56 )             46.3     03-23    141  |  105  |  20  ----------------------------<  87  3.6   |  28  |  1.00    Ca    9.4      23 Mar 2020 09:08    TPro  6.8  /  Alb  3.0<L>  /  TBili  1.0  /  DBili  x   /  AST  43<H>  /  ALT  32  /  AlkPhos  83  03-23          RADIOLOGY & ADDITIONAL TESTS:

## 2020-03-23 NOTE — PROVIDER CONTACT NOTE (OTHER) - RECOMMENDATIONS
Ativan and Zyprexa ordered PRN
Oxygen orders.
discharge patient. Contact son and family.
cough medication

## 2020-03-23 NOTE — PROVIDER CONTACT NOTE (OTHER) - ACTION/TREATMENT ORDERED:
per Dr Church give Zyprexa with Ativan at the same time.  No other new orders. per Dr Church give Zyprexa with Ativan at the same time.  per md she will order 2mg of ativan at this time. No other new orders. per Dr Church give Zyprexa with Ativan at the same time.  per md she will order another 2mg of ativan at this time. No other new orders.

## 2020-03-24 ENCOUNTER — INPATIENT (INPATIENT)
Facility: HOSPITAL | Age: 75
LOS: 27 days | Discharge: HOME | End: 2020-04-21
Attending: PSYCHIATRY & NEUROLOGY | Admitting: PSYCHIATRY & NEUROLOGY
Payer: SELF-PAY

## 2020-03-24 VITALS
DIASTOLIC BLOOD PRESSURE: 74 MMHG | HEIGHT: 72 IN | SYSTOLIC BLOOD PRESSURE: 145 MMHG | TEMPERATURE: 97 F | WEIGHT: 206.35 LBS | HEART RATE: 64 BPM

## 2020-03-24 VITALS
SYSTOLIC BLOOD PRESSURE: 102 MMHG | TEMPERATURE: 98 F | HEART RATE: 60 BPM | RESPIRATION RATE: 18 BRPM | DIASTOLIC BLOOD PRESSURE: 65 MMHG | OXYGEN SATURATION: 88 %

## 2020-03-24 DIAGNOSIS — F33.2 MAJOR DEPRESSIVE DISORDER, RECURRENT SEVERE WITHOUT PSYCHOTIC FEATURES: ICD-10-CM

## 2020-03-24 PROBLEM — F03.90 UNSPECIFIED DEMENTIA WITHOUT BEHAVIORAL DISTURBANCE: Chronic | Status: ACTIVE | Noted: 2020-03-12

## 2020-03-24 LAB
ANION GAP SERPL CALC-SCNC: 9 MMOL/L — SIGNIFICANT CHANGE UP (ref 5–17)
BUN SERPL-MCNC: 21 MG/DL — SIGNIFICANT CHANGE UP (ref 7–23)
CALCIUM SERPL-MCNC: 8.9 MG/DL — SIGNIFICANT CHANGE UP (ref 8.5–10.1)
CHLORIDE SERPL-SCNC: 106 MMOL/L — SIGNIFICANT CHANGE UP (ref 96–108)
CO2 SERPL-SCNC: 27 MMOL/L — SIGNIFICANT CHANGE UP (ref 22–31)
CREAT SERPL-MCNC: 0.97 MG/DL — SIGNIFICANT CHANGE UP (ref 0.5–1.3)
GLUCOSE SERPL-MCNC: 99 MG/DL — SIGNIFICANT CHANGE UP (ref 70–99)
HCT VFR BLD CALC: 46.1 % — SIGNIFICANT CHANGE UP (ref 39–50)
HGB BLD-MCNC: 15.5 G/DL — SIGNIFICANT CHANGE UP (ref 13–17)
MCHC RBC-ENTMCNC: 29.1 PG — SIGNIFICANT CHANGE UP (ref 27–34)
MCHC RBC-ENTMCNC: 33.6 GM/DL — SIGNIFICANT CHANGE UP (ref 32–36)
MCV RBC AUTO: 86.5 FL — SIGNIFICANT CHANGE UP (ref 80–100)
NRBC # BLD: 0 /100 WBCS — SIGNIFICANT CHANGE UP (ref 0–0)
PLATELET # BLD AUTO: 204 K/UL — SIGNIFICANT CHANGE UP (ref 150–400)
POTASSIUM SERPL-MCNC: 3.6 MMOL/L — SIGNIFICANT CHANGE UP (ref 3.5–5.3)
POTASSIUM SERPL-SCNC: 3.6 MMOL/L — SIGNIFICANT CHANGE UP (ref 3.5–5.3)
RBC # BLD: 5.33 M/UL — SIGNIFICANT CHANGE UP (ref 4.2–5.8)
RBC # FLD: 12.6 % — SIGNIFICANT CHANGE UP (ref 10.3–14.5)
SODIUM SERPL-SCNC: 142 MMOL/L — SIGNIFICANT CHANGE UP (ref 135–145)
WBC # BLD: 6.75 K/UL — SIGNIFICANT CHANGE UP (ref 3.8–10.5)
WBC # FLD AUTO: 6.75 K/UL — SIGNIFICANT CHANGE UP (ref 3.8–10.5)

## 2020-03-24 PROCEDURE — 85027 COMPLETE CBC AUTOMATED: CPT

## 2020-03-24 PROCEDURE — 82746 ASSAY OF FOLIC ACID SERUM: CPT

## 2020-03-24 PROCEDURE — 96376 TX/PRO/DX INJ SAME DRUG ADON: CPT

## 2020-03-24 PROCEDURE — 96372 THER/PROPH/DIAG INJ SC/IM: CPT

## 2020-03-24 PROCEDURE — 96374 THER/PROPH/DIAG INJ IV PUSH: CPT

## 2020-03-24 PROCEDURE — 70450 CT HEAD/BRAIN W/O DYE: CPT

## 2020-03-24 PROCEDURE — 80048 BASIC METABOLIC PNL TOTAL CA: CPT

## 2020-03-24 PROCEDURE — 81001 URINALYSIS AUTO W/SCOPE: CPT

## 2020-03-24 PROCEDURE — 36415 COLL VENOUS BLD VENIPUNCTURE: CPT

## 2020-03-24 PROCEDURE — 84439 ASSAY OF FREE THYROXINE: CPT

## 2020-03-24 PROCEDURE — 99285 EMERGENCY DEPT VISIT HI MDM: CPT

## 2020-03-24 PROCEDURE — 86780 TREPONEMA PALLIDUM: CPT

## 2020-03-24 PROCEDURE — 71045 X-RAY EXAM CHEST 1 VIEW: CPT

## 2020-03-24 PROCEDURE — 83970 ASSAY OF PARATHORMONE: CPT

## 2020-03-24 PROCEDURE — 99239 HOSP IP/OBS DSCHRG MGMT >30: CPT

## 2020-03-24 PROCEDURE — 85610 PROTHROMBIN TIME: CPT

## 2020-03-24 PROCEDURE — 85730 THROMBOPLASTIN TIME PARTIAL: CPT

## 2020-03-24 PROCEDURE — 93005 ELECTROCARDIOGRAM TRACING: CPT

## 2020-03-24 PROCEDURE — 83690 ASSAY OF LIPASE: CPT

## 2020-03-24 PROCEDURE — 80307 DRUG TEST PRSMV CHEM ANLYZR: CPT

## 2020-03-24 PROCEDURE — 87086 URINE CULTURE/COLONY COUNT: CPT

## 2020-03-24 PROCEDURE — 82310 ASSAY OF CALCIUM: CPT

## 2020-03-24 PROCEDURE — 86803 HEPATITIS C AB TEST: CPT

## 2020-03-24 PROCEDURE — 99221 1ST HOSP IP/OBS SF/LOW 40: CPT

## 2020-03-24 PROCEDURE — 82607 VITAMIN B-12: CPT

## 2020-03-24 PROCEDURE — 80053 COMPREHEN METABOLIC PANEL: CPT

## 2020-03-24 PROCEDURE — 84443 ASSAY THYROID STIM HORMONE: CPT

## 2020-03-24 RX ORDER — HALOPERIDOL DECANOATE 100 MG/ML
0.5 INJECTION INTRAMUSCULAR ONCE
Refills: 0 | Status: DISCONTINUED | OUTPATIENT
Start: 2020-03-24 | End: 2020-03-25

## 2020-03-24 RX ORDER — HALOPERIDOL DECANOATE 100 MG/ML
0.5 INJECTION INTRAMUSCULAR ONCE
Refills: 0 | Status: COMPLETED | OUTPATIENT
Start: 2020-03-24 | End: 2020-03-24

## 2020-03-24 RX ORDER — OLANZAPINE 15 MG/1
1 TABLET, FILM COATED ORAL
Qty: 0 | Refills: 0 | DISCHARGE
Start: 2020-03-24

## 2020-03-24 RX ORDER — OLANZAPINE 15 MG/1
10 TABLET, FILM COATED ORAL
Qty: 0 | Refills: 0 | DISCHARGE
Start: 2020-03-24

## 2020-03-24 RX ORDER — ENOXAPARIN SODIUM 100 MG/ML
40 INJECTION SUBCUTANEOUS DAILY
Refills: 0 | Status: DISCONTINUED | OUTPATIENT
Start: 2020-03-24 | End: 2020-03-27

## 2020-03-24 RX ORDER — HALOPERIDOL DECANOATE 100 MG/ML
0.5 INJECTION INTRAMUSCULAR EVERY 8 HOURS
Refills: 0 | Status: DISCONTINUED | OUTPATIENT
Start: 2020-03-24 | End: 2020-03-25

## 2020-03-24 RX ORDER — OLANZAPINE 15 MG/1
2.5 TABLET, FILM COATED ORAL
Refills: 0 | Status: DISCONTINUED | OUTPATIENT
Start: 2020-03-24 | End: 2020-03-25

## 2020-03-24 RX ADMIN — Medication 1 MILLIGRAM(S): at 12:08

## 2020-03-24 RX ADMIN — OLANZAPINE 10 MILLIGRAM(S): 15 TABLET, FILM COATED ORAL at 05:16

## 2020-03-24 RX ADMIN — HALOPERIDOL DECANOATE 0.5 MILLIGRAM(S): 100 INJECTION INTRAMUSCULAR at 21:07

## 2020-03-24 RX ADMIN — ENOXAPARIN SODIUM 40 MILLIGRAM(S): 100 INJECTION SUBCUTANEOUS at 12:23

## 2020-03-24 RX ADMIN — OLANZAPINE 10 MILLIGRAM(S): 15 TABLET, FILM COATED ORAL at 14:46

## 2020-03-24 RX ADMIN — HALOPERIDOL DECANOATE 0.5 MILLIGRAM(S): 100 INJECTION INTRAMUSCULAR at 22:36

## 2020-03-24 RX ADMIN — OLANZAPINE 2.5 MILLIGRAM(S): 15 TABLET, FILM COATED ORAL at 21:07

## 2020-03-24 NOTE — PROGRESS NOTE ADULT - REASON FOR ADMISSION
Confusion

## 2020-03-24 NOTE — PROGRESS NOTE ADULT - SUBJECTIVE AND OBJECTIVE BOX
Patient seen and examined at bedside. Patient still agitated and combative, needs inpatient psych admission. For possible transfer to [facility] today.    Physical Exam:  General: WN/WD NAD  Neurology: A&Ox2, nonfocal, TERRELL x 4  Respiratory: CTA B/L  CV: RRR, S1S2  Abdominal: Soft, NT, ND +BS  Extremities: No edema, + peripheral pulses    Please refer to updated D/C note for further details.

## 2020-03-24 NOTE — PROGRESS NOTE ADULT - PROBLEM SELECTOR PROBLEM 1
Primary hyperparathyroidism
Primary hyperparathyroidism
Confusion

## 2020-03-24 NOTE — PROGRESS NOTE ADULT - PROBLEM SELECTOR PLAN 1
s/p iv bisphosphonate  cont monitoring calcium levels  anticipate normal calcium levels for roughly 3-4 weeks

## 2020-03-24 NOTE — PROGRESS NOTE ADULT - SUBJECTIVE AND OBJECTIVE BOX
Patient is a 75y old  Male who presents with a chief complaint of Confusion (23 Mar 2020 17:43)        INTERVAL HPI/OVERNIGHT EVENTS:    MEDICATIONS  (STANDING):  enoxaparin Injectable 40 milliGRAM(s) SubCutaneous daily  OLANZapine Disintegrating Tablet 5 milliGRAM(s) Oral two times a day    MEDICATIONS  (PRN):  LORazepam   Injectable 1 milliGRAM(s) IV Push every 8 hours PRN Agitation  OLANZapine Injectable 10 milliGRAM(s) IntraMuscular every 6 hours PRN acute agitation      Allergies    No Known Allergies    Intolerances          Vital Signs Last 24 Hrs  T(C): 36.9 (24 Mar 2020 05:30), Max: 37.3 (23 Mar 2020 12:56)  T(F): 98.4 (24 Mar 2020 05:30), Max: 99.1 (23 Mar 2020 12:56)  HR: 82 (24 Mar 2020 05:30) (61 - 82)  BP: 157/81 (24 Mar 2020 05:30) (111/71 - 157/81)  BP(mean): --  RR: 17 (24 Mar 2020 05:30) (17 - 18)  SpO2: 93% (24 Mar 2020 05:30) (88% - 93%)    General: WN/WD NAD  Respiratory: CTA B/L  CV: RRR, S1S2, no murmurs, rubs or gallops  Abdominal: Soft, NT, ND +BS, Last BM  Extremities: No edema, + peripheral pulses    LABS:                        15.5   6.75  )-----------( 204      ( 24 Mar 2020 09:38 )             46.1     03-24    142  |  106  |  21  ----------------------------<  99  3.6   |  27  |  0.97    Ca    8.9      24 Mar 2020 09:38    TPro  6.8  /  Alb  3.0<L>  /  TBili  1.0  /  DBili  x   /  AST  43<H>  /  ALT  32  /  AlkPhos  83  03-23    CAPILLARY BLOOD GLUCOSE              RADIOLOGY & ADDITIONAL TESTS:

## 2020-03-24 NOTE — PROGRESS NOTE BEHAVIORAL HEALTH - NSBHCONSULTMEDAGITATION_PSY_A_CORE FT
Ativan 1 mg IV q 8hourly prn - agitation

## 2020-03-24 NOTE — PROGRESS NOTE BEHAVIORAL HEALTH - NSBHCONSULTMEDSEVERE_PSY_A_CORE FT
Zyprexa 10 mg IM q 6hourl prn - acute agitation

## 2020-03-24 NOTE — PROGRESS NOTE BEHAVIORAL HEALTH - NSBHCONSULTMEDPRNREASON_PSY_A_CORE
severe agitation.../agitation...
agitation.../severe agitation...
agitation.../severe agitation...
severe agitation.../agitation...

## 2020-03-24 NOTE — PROGRESS NOTE BEHAVIORAL HEALTH - NSBHFUPINTERVALHXFT_PSY_A_CORE
Patient seen, evaluated and chart reviewed. Patient has reportedly been agitated and confused today requiring several PRNs again. Patient is in two point restraints. As per medical team patient is medically stable. Patient at this time is agitated and as per nursing staff tends to be combative toward the staff.

## 2020-03-24 NOTE — PROGRESS NOTE BEHAVIORAL HEALTH - NSBHCHARTREVIEWVS_PSY_A_CORE FT
Vital Signs Last 24 Hrs  T(C): 36.9 (24 Mar 2020 05:30), Max: 37.2 (23 Mar 2020 20:19)  T(F): 98.4 (24 Mar 2020 05:30), Max: 98.9 (23 Mar 2020 20:19)  HR: 82 (24 Mar 2020 05:30) (61 - 82)  BP: 157/81 (24 Mar 2020 05:30) (122/71 - 157/81)  BP(mean): --  RR: 17 (24 Mar 2020 05:30) (17 - 18)  SpO2: 93% (24 Mar 2020 05:30) (91% - 93%)

## 2020-03-24 NOTE — PROGRESS NOTE BEHAVIORAL HEALTH - PRIMARY DX
Dementia with behavioral disturbance, unspecified dementia type

## 2020-03-24 NOTE — PROGRESS NOTE BEHAVIORAL HEALTH - PRN MEDS
Zyprexa 10 mg IM and Ativan 1 mg IV

## 2020-03-24 NOTE — PROGRESS NOTE BEHAVIORAL HEALTH - NSBHCHARTREVIEWLAB_PSY_A_CORE FT
15.5   6.75  )-----------( 204      ( 24 Mar 2020 09:38 )             46.1   03-24    142  |  106  |  21  ----------------------------<  99  3.6   |  27  |  0.97    Ca    8.9      24 Mar 2020 09:38    TPro  6.8  /  Alb  3.0<L>  /  TBili  1.0  /  DBili  x   /  AST  43<H>  /  ALT  32  /  AlkPhos  83  03-23
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
## 2020-03-24 NOTE — CHART NOTE - NSCHARTNOTEFT_GEN_A_CORE
Screening Medical Evaluation  Patient Admitted from: [facility] admitting diagnosis: Severe episode of recurrent major depressive disorder, without psychotic features      PAST MEDICAL & SURGICAL HISTORY:  Dementia: not diagnosed  No significant past surgical history        Allergies    No Known Allergies    Intolerances        Social History:     FAMILY HISTORY:  No pertinent family history in first degree relatives      MEDICATIONS  (STANDING):  enoxaparin Injectable 40 milliGRAM(s) SubCutaneous daily  OLANZapine 2.5 milliGRAM(s) Oral two times a day    MEDICATIONS  (PRN):  haloperidol     Tablet 0.5 milliGRAM(s) Oral every 8 hours PRN agitation  haloperidol    Injectable 0.5 milliGRAM(s) IntraMuscular once PRN agitation      Vital Signs Last 24 Hrs  T(C): 36.3 (24 Mar 2020 16:28), Max: 37.2 (23 Mar 2020 20:19)  T(F): 97.3 (24 Mar 2020 16:28), Max: 98.9 (23 Mar 2020 20:19)  HR: 64 (24 Mar 2020 16:28) (60 - 82)  BP: 145/74 (24 Mar 2020 16:28) (102/65 - 157/81)  BP(mean): --  RR: 18 (24 Mar 2020 14:00) (17 - 18)  SpO2: 88% (24 Mar 2020 14:00) (88% - 93%)  CAPILLARY BLOOD GLUCOSE            PHYSICAL EXAM:  GENERAL: NAD, well-developed  HEAD:  Atraumatic, Normocephalic  EYES: EOMI, PERRLA, conjunctiva and sclera clear  NECK: Supple, No JVD  CHEST/LUNG: Clear to auscultation bilaterally; No wheeze  HEART: Regular rate and rhythm; No murmurs, rubs, or gallops  ABDOMEN: Soft, Nontender, Nondistended; Bowel sounds present  EXTREMITIES:  2+ Peripheral Pulses, No clubbing, cyanosis, or edema  PSYCH: AAOx3  NEUROLOGY: non-focal  SKIN:     LABS:                        15.5   6.75  )-----------( 204      ( 24 Mar 2020 09:38 )             46.1     03-24    142  |  106  |  21  ----------------------------<  99  3.6   |  27  |  0.97    Ca    8.9      24 Mar 2020 09:38    TPro  6.8  /  Alb  3.0<L>  /  TBili  1.0  /  DBili  x   /  AST  43<H>  /  ALT  32  /  AlkPhos  83  03-23              RADIOLOGY & ADDITIONAL TESTS:    Assessment and Plan: 74 yo M with PMH of dementia is admitted to [facility] with a primary psychiatric diagnosis of Severe episode of recurrent major depressive disorder, without psychotic features. The pt currently denies having any medical complaints such as chest pain, sob, abdominal pain, n/v/d/c, or any problems with urination or bowel movements. The rest of his screening physical is unremarkable.    1.Severe episode of recurrent major depressive disorder, without psychotic features-Plan: continue with meds as per primary psychiatric team Screening Medical Evaluation  Patient Admitted from: [facility] admitting diagnosis: Severe episode of recurrent major depressive disorder, without psychotic features      PAST MEDICAL & SURGICAL HISTORY:  Dementia: not diagnosed  No significant past surgical history        Allergies    No Known Allergies    Intolerances        Social History:     FAMILY HISTORY:  No pertinent family history in first degree relatives      MEDICATIONS  (STANDING):  enoxaparin Injectable 40 milliGRAM(s) SubCutaneous daily  OLANZapine 2.5 milliGRAM(s) Oral two times a day    MEDICATIONS  (PRN):  haloperidol     Tablet 0.5 milliGRAM(s) Oral every 8 hours PRN agitation  haloperidol    Injectable 0.5 milliGRAM(s) IntraMuscular once PRN agitation      Vital Signs Last 24 Hrs  T(C): 36.3 (24 Mar 2020 16:28), Max: 37.2 (23 Mar 2020 20:19)  T(F): 97.3 (24 Mar 2020 16:28), Max: 98.9 (23 Mar 2020 20:19)  HR: 64 (24 Mar 2020 16:28) (60 - 82)  BP: 145/74 (24 Mar 2020 16:28) (102/65 - 157/81)  BP(mean): --  RR: 18 (24 Mar 2020 14:00) (17 - 18)  SpO2: 88% (24 Mar 2020 14:00) (88% - 93%)  CAPILLARY BLOOD GLUCOSE            PHYSICAL EXAM:  GENERAL: NAD, well-developed  HEAD:  Atraumatic, Normocephalic  EYES: EOMI, PERRLA, conjunctiva and sclera clear  NECK: Supple, No JVD  CHEST/LUNG: Clear to auscultation bilaterally; No wheeze  HEART: Regular rate and rhythm; No murmurs, rubs, or gallops  ABDOMEN: Soft, Nontender, Nondistended; Bowel sounds present  EXTREMITIES:  2+ Peripheral Pulses, No clubbing, cyanosis, or edema  PSYCH: AAOx3  NEUROLOGY: non-focal  SKIN: In tact    LABS:                        15.5   6.75  )-----------( 204      ( 24 Mar 2020 09:38 )             46.1     03-24    142  |  106  |  21  ----------------------------<  99  3.6   |  27  |  0.97    Ca    8.9      24 Mar 2020 09:38    TPro  6.8  /  Alb  3.0<L>  /  TBili  1.0  /  DBili  x   /  AST  43<H>  /  ALT  32  /  AlkPhos  83  03-23              RADIOLOGY & ADDITIONAL TESTS:    Assessment and Plan: 76 yo M with PMH of dementia is admitted to [facility] with a primary psychiatric diagnosis of Severe episode of recurrent major depressive disorder, without psychotic features. The pt currently denies having any medical complaints such as chest pain, sob, abdominal pain, n/v/d/c, or any problems with urination or bowel movements. The rest of his screening physical is unremarkable.    1.Severe episode of recurrent major depressive disorder, without psychotic features-Plan: continue with meds as per primary psychiatric team

## 2020-03-25 LAB
CHOLEST SERPL-MCNC: 130 MG/DL — SIGNIFICANT CHANGE UP (ref 120–199)
HDLC SERPL-MCNC: 21 MG/DL — LOW (ref 35–55)
LIPID PNL WITH DIRECT LDL SERPL: 85 MG/DL — SIGNIFICANT CHANGE UP
TRIGL SERPL-MCNC: 132 MG/DL — SIGNIFICANT CHANGE UP (ref 10–149)

## 2020-03-25 PROCEDURE — 99233 SBSQ HOSP IP/OBS HIGH 50: CPT

## 2020-03-25 RX ORDER — HALOPERIDOL DECANOATE 100 MG/ML
1 INJECTION INTRAMUSCULAR ONCE
Refills: 0 | Status: COMPLETED | OUTPATIENT
Start: 2020-03-25 | End: 2020-03-25

## 2020-03-25 RX ORDER — OLANZAPINE 15 MG/1
5 TABLET, FILM COATED ORAL EVERY 6 HOURS
Refills: 0 | Status: DISCONTINUED | OUTPATIENT
Start: 2020-03-25 | End: 2020-03-26

## 2020-03-25 RX ORDER — OLANZAPINE 15 MG/1
2.5 TABLET, FILM COATED ORAL ONCE
Refills: 0 | Status: DISCONTINUED | OUTPATIENT
Start: 2020-03-25 | End: 2020-03-28

## 2020-03-25 RX ORDER — OLANZAPINE 15 MG/1
5 TABLET, FILM COATED ORAL
Refills: 0 | Status: DISCONTINUED | OUTPATIENT
Start: 2020-03-25 | End: 2020-03-26

## 2020-03-25 RX ORDER — OLANZAPINE 15 MG/1
2.5 TABLET, FILM COATED ORAL ONCE
Refills: 0 | Status: COMPLETED | OUTPATIENT
Start: 2020-03-25 | End: 2020-03-25

## 2020-03-25 RX ADMIN — OLANZAPINE 2.5 MILLIGRAM(S): 15 TABLET, FILM COATED ORAL at 17:29

## 2020-03-25 RX ADMIN — HALOPERIDOL DECANOATE 1 MILLIGRAM(S): 100 INJECTION INTRAMUSCULAR at 01:35

## 2020-03-25 RX ADMIN — OLANZAPINE 5 MILLIGRAM(S): 15 TABLET, FILM COATED ORAL at 09:14

## 2020-03-25 NOTE — CHART NOTE - NSCHARTNOTEFT_GEN_A_CORE
Pt. examined in common room. Reports back pain. Denies chest pain, fever, chills. Reports cough for 3 weeks endorses seasonal allergies. VS reviewed - afebrile <100.0. On PE, Lungs CTAB. Low concern for COVID. No need to transfer for testing.

## 2020-03-26 PROCEDURE — 99232 SBSQ HOSP IP/OBS MODERATE 35: CPT

## 2020-03-26 RX ORDER — OLANZAPINE 15 MG/1
2.5 TABLET, FILM COATED ORAL ONCE
Refills: 0 | Status: COMPLETED | OUTPATIENT
Start: 2020-03-26 | End: 2020-03-26

## 2020-03-26 RX ORDER — OLANZAPINE 15 MG/1
5 TABLET, FILM COATED ORAL EVERY 6 HOURS
Refills: 0 | Status: DISCONTINUED | OUTPATIENT
Start: 2020-03-26 | End: 2020-04-21

## 2020-03-26 RX ORDER — OLANZAPINE 15 MG/1
5 TABLET, FILM COATED ORAL
Refills: 0 | Status: DISCONTINUED | OUTPATIENT
Start: 2020-03-26 | End: 2020-03-29

## 2020-03-26 RX ADMIN — OLANZAPINE 5 MILLIGRAM(S): 15 TABLET, FILM COATED ORAL at 20:00

## 2020-03-26 RX ADMIN — OLANZAPINE 5 MILLIGRAM(S): 15 TABLET, FILM COATED ORAL at 08:43

## 2020-03-26 RX ADMIN — OLANZAPINE 2.5 MILLIGRAM(S): 15 TABLET, FILM COATED ORAL at 00:23

## 2020-03-27 PROCEDURE — 99232 SBSQ HOSP IP/OBS MODERATE 35: CPT

## 2020-03-27 RX ADMIN — OLANZAPINE 5 MILLIGRAM(S): 15 TABLET, FILM COATED ORAL at 17:50

## 2020-03-27 RX ADMIN — OLANZAPINE 5 MILLIGRAM(S): 15 TABLET, FILM COATED ORAL at 20:34

## 2020-03-28 PROCEDURE — 99232 SBSQ HOSP IP/OBS MODERATE 35: CPT

## 2020-03-28 RX ORDER — OLANZAPINE 15 MG/1
5 TABLET, FILM COATED ORAL ONCE
Refills: 0 | Status: DISCONTINUED | OUTPATIENT
Start: 2020-03-28 | End: 2020-03-29

## 2020-03-28 RX ORDER — OLANZAPINE 15 MG/1
5 TABLET, FILM COATED ORAL ONCE
Refills: 0 | Status: COMPLETED | OUTPATIENT
Start: 2020-03-28 | End: 2020-03-28

## 2020-03-28 RX ADMIN — OLANZAPINE 5 MILLIGRAM(S): 15 TABLET, FILM COATED ORAL at 09:25

## 2020-03-28 RX ADMIN — OLANZAPINE 5 MILLIGRAM(S): 15 TABLET, FILM COATED ORAL at 07:40

## 2020-03-29 PROCEDURE — 99232 SBSQ HOSP IP/OBS MODERATE 35: CPT

## 2020-03-29 RX ORDER — OLANZAPINE 15 MG/1
7.5 TABLET, FILM COATED ORAL ONCE
Refills: 0 | Status: DISCONTINUED | OUTPATIENT
Start: 2020-03-29 | End: 2020-04-02

## 2020-03-29 RX ORDER — OLANZAPINE 15 MG/1
5 TABLET, FILM COATED ORAL
Refills: 0 | Status: DISCONTINUED | OUTPATIENT
Start: 2020-03-29 | End: 2020-03-30

## 2020-03-29 RX ORDER — OLANZAPINE 15 MG/1
7.5 TABLET, FILM COATED ORAL ONCE
Refills: 0 | Status: COMPLETED | OUTPATIENT
Start: 2020-03-29 | End: 2020-03-29

## 2020-03-29 RX ORDER — OLANZAPINE 15 MG/1
5 TABLET, FILM COATED ORAL ONCE
Refills: 0 | Status: DISCONTINUED | OUTPATIENT
Start: 2020-03-29 | End: 2020-03-29

## 2020-03-29 RX ADMIN — OLANZAPINE 5 MILLIGRAM(S): 15 TABLET, FILM COATED ORAL at 08:35

## 2020-03-29 RX ADMIN — OLANZAPINE 7.5 MILLIGRAM(S): 15 TABLET, FILM COATED ORAL at 08:38

## 2020-03-30 RX ORDER — OLANZAPINE 15 MG/1
5 TABLET, FILM COATED ORAL
Refills: 0 | Status: DISCONTINUED | OUTPATIENT
Start: 2020-03-30 | End: 2020-04-01

## 2020-03-30 RX ORDER — OLANZAPINE 15 MG/1
10 TABLET, FILM COATED ORAL
Refills: 0 | Status: DISCONTINUED | OUTPATIENT
Start: 2020-03-30 | End: 2020-04-08

## 2020-03-30 RX ORDER — OLANZAPINE 15 MG/1
7.5 TABLET, FILM COATED ORAL
Refills: 0 | Status: DISCONTINUED | OUTPATIENT
Start: 2020-03-30 | End: 2020-03-30

## 2020-03-30 RX ADMIN — OLANZAPINE 5 MILLIGRAM(S): 15 TABLET, FILM COATED ORAL at 09:28

## 2020-03-30 RX ADMIN — OLANZAPINE 10 MILLIGRAM(S): 15 TABLET, FILM COATED ORAL at 16:31

## 2020-03-31 PROCEDURE — 99232 SBSQ HOSP IP/OBS MODERATE 35: CPT | Mod: GC

## 2020-03-31 RX ORDER — OLANZAPINE 15 MG/1
7.5 TABLET, FILM COATED ORAL ONCE
Refills: 0 | Status: DISCONTINUED | OUTPATIENT
Start: 2020-03-31 | End: 2020-03-31

## 2020-03-31 RX ORDER — IBUPROFEN 200 MG
400 TABLET ORAL EVERY 8 HOURS
Refills: 0 | Status: DISCONTINUED | OUTPATIENT
Start: 2020-03-31 | End: 2020-03-31

## 2020-03-31 RX ORDER — IBUPROFEN 200 MG
400 TABLET ORAL EVERY 8 HOURS
Refills: 0 | Status: DISCONTINUED | OUTPATIENT
Start: 2020-03-31 | End: 2020-04-21

## 2020-03-31 RX ADMIN — OLANZAPINE 10 MILLIGRAM(S): 15 TABLET, FILM COATED ORAL at 15:40

## 2020-03-31 NOTE — PHARMACOTHERAPY INTERVENTION NOTE - COMMENTS
Calling because this order should be PRN. Calling because this order should be PRN. Dr. Lieberman changed order.

## 2020-04-01 PROCEDURE — 99232 SBSQ HOSP IP/OBS MODERATE 35: CPT | Mod: GC

## 2020-04-01 RX ORDER — OLANZAPINE 15 MG/1
10 TABLET, FILM COATED ORAL
Refills: 0 | Status: DISCONTINUED | OUTPATIENT
Start: 2020-04-02 | End: 2020-04-14

## 2020-04-01 RX ADMIN — OLANZAPINE 5 MILLIGRAM(S): 15 TABLET, FILM COATED ORAL at 08:54

## 2020-04-01 RX ADMIN — OLANZAPINE 10 MILLIGRAM(S): 15 TABLET, FILM COATED ORAL at 17:57

## 2020-04-02 PROCEDURE — 90832 PSYTX W PT 30 MINUTES: CPT

## 2020-04-02 RX ORDER — OLANZAPINE 15 MG/1
7.5 TABLET, FILM COATED ORAL ONCE
Refills: 0 | Status: DISCONTINUED | OUTPATIENT
Start: 2020-04-02 | End: 2020-04-02

## 2020-04-02 RX ORDER — DIVALPROEX SODIUM 500 MG/1
250 TABLET, DELAYED RELEASE ORAL
Refills: 0 | Status: DISCONTINUED | OUTPATIENT
Start: 2020-04-02 | End: 2020-04-07

## 2020-04-02 RX ORDER — HALOPERIDOL DECANOATE 100 MG/ML
2 INJECTION INTRAMUSCULAR ONCE
Refills: 0 | Status: DISCONTINUED | OUTPATIENT
Start: 2020-04-02 | End: 2020-04-21

## 2020-04-02 RX ORDER — HALOPERIDOL DECANOATE 100 MG/ML
2 INJECTION INTRAMUSCULAR ONCE
Refills: 0 | Status: COMPLETED | OUTPATIENT
Start: 2020-04-02 | End: 2020-04-02

## 2020-04-02 RX ADMIN — HALOPERIDOL DECANOATE 2 MILLIGRAM(S): 100 INJECTION INTRAMUSCULAR at 10:55

## 2020-04-02 RX ADMIN — OLANZAPINE 10 MILLIGRAM(S): 15 TABLET, FILM COATED ORAL at 18:08

## 2020-04-02 RX ADMIN — OLANZAPINE 10 MILLIGRAM(S): 15 TABLET, FILM COATED ORAL at 08:29

## 2020-04-03 RX ORDER — HALOPERIDOL DECANOATE 100 MG/ML
5 INJECTION INTRAMUSCULAR ONCE
Refills: 0 | Status: COMPLETED | OUTPATIENT
Start: 2020-04-03 | End: 2020-04-03

## 2020-04-03 RX ORDER — HALOPERIDOL DECANOATE 100 MG/ML
5 INJECTION INTRAMUSCULAR ONCE
Refills: 0 | Status: DISCONTINUED | OUTPATIENT
Start: 2020-04-03 | End: 2020-04-21

## 2020-04-03 RX ADMIN — OLANZAPINE 10 MILLIGRAM(S): 15 TABLET, FILM COATED ORAL at 17:03

## 2020-04-03 RX ADMIN — Medication 2 MILLIGRAM(S): at 13:01

## 2020-04-03 RX ADMIN — HALOPERIDOL DECANOATE 5 MILLIGRAM(S): 100 INJECTION INTRAMUSCULAR at 10:07

## 2020-04-04 PROCEDURE — 99231 SBSQ HOSP IP/OBS SF/LOW 25: CPT

## 2020-04-04 RX ADMIN — OLANZAPINE 10 MILLIGRAM(S): 15 TABLET, FILM COATED ORAL at 08:52

## 2020-04-04 RX ADMIN — OLANZAPINE 10 MILLIGRAM(S): 15 TABLET, FILM COATED ORAL at 17:22

## 2020-04-05 RX ADMIN — OLANZAPINE 10 MILLIGRAM(S): 15 TABLET, FILM COATED ORAL at 16:57

## 2020-04-05 RX ADMIN — OLANZAPINE 10 MILLIGRAM(S): 15 TABLET, FILM COATED ORAL at 09:01

## 2020-04-06 PROCEDURE — 99232 SBSQ HOSP IP/OBS MODERATE 35: CPT

## 2020-04-06 RX ADMIN — OLANZAPINE 10 MILLIGRAM(S): 15 TABLET, FILM COATED ORAL at 10:43

## 2020-04-06 RX ADMIN — OLANZAPINE 10 MILLIGRAM(S): 15 TABLET, FILM COATED ORAL at 17:52

## 2020-04-07 PROCEDURE — 99232 SBSQ HOSP IP/OBS MODERATE 35: CPT

## 2020-04-07 RX ADMIN — OLANZAPINE 10 MILLIGRAM(S): 15 TABLET, FILM COATED ORAL at 17:51

## 2020-04-07 RX ADMIN — OLANZAPINE 10 MILLIGRAM(S): 15 TABLET, FILM COATED ORAL at 08:28

## 2020-04-08 PROCEDURE — 99232 SBSQ HOSP IP/OBS MODERATE 35: CPT

## 2020-04-08 RX ORDER — OLANZAPINE 15 MG/1
15 TABLET, FILM COATED ORAL
Refills: 0 | Status: DISCONTINUED | OUTPATIENT
Start: 2020-04-08 | End: 2020-04-21

## 2020-04-08 RX ADMIN — OLANZAPINE 10 MILLIGRAM(S): 15 TABLET, FILM COATED ORAL at 08:58

## 2020-04-08 RX ADMIN — OLANZAPINE 15 MILLIGRAM(S): 15 TABLET, FILM COATED ORAL at 17:23

## 2020-04-09 PROCEDURE — 99232 SBSQ HOSP IP/OBS MODERATE 35: CPT

## 2020-04-09 RX ADMIN — OLANZAPINE 10 MILLIGRAM(S): 15 TABLET, FILM COATED ORAL at 08:41

## 2020-04-09 RX ADMIN — OLANZAPINE 15 MILLIGRAM(S): 15 TABLET, FILM COATED ORAL at 16:53

## 2020-04-10 PROCEDURE — 99232 SBSQ HOSP IP/OBS MODERATE 35: CPT

## 2020-04-10 RX ADMIN — OLANZAPINE 15 MILLIGRAM(S): 15 TABLET, FILM COATED ORAL at 16:48

## 2020-04-10 RX ADMIN — OLANZAPINE 10 MILLIGRAM(S): 15 TABLET, FILM COATED ORAL at 07:59

## 2020-04-11 RX ADMIN — OLANZAPINE 15 MILLIGRAM(S): 15 TABLET, FILM COATED ORAL at 16:53

## 2020-04-11 RX ADMIN — OLANZAPINE 10 MILLIGRAM(S): 15 TABLET, FILM COATED ORAL at 08:31

## 2020-04-12 RX ADMIN — OLANZAPINE 15 MILLIGRAM(S): 15 TABLET, FILM COATED ORAL at 17:20

## 2020-04-12 RX ADMIN — OLANZAPINE 10 MILLIGRAM(S): 15 TABLET, FILM COATED ORAL at 08:30

## 2020-04-13 PROCEDURE — 99232 SBSQ HOSP IP/OBS MODERATE 35: CPT

## 2020-04-13 RX ADMIN — OLANZAPINE 10 MILLIGRAM(S): 15 TABLET, FILM COATED ORAL at 08:51

## 2020-04-13 RX ADMIN — OLANZAPINE 15 MILLIGRAM(S): 15 TABLET, FILM COATED ORAL at 17:14

## 2020-04-14 VITALS — TEMPERATURE: 98 F

## 2020-04-14 PROCEDURE — 99232 SBSQ HOSP IP/OBS MODERATE 35: CPT

## 2020-04-14 RX ORDER — OLANZAPINE 15 MG/1
15 TABLET, FILM COATED ORAL
Refills: 0 | Status: DISCONTINUED | OUTPATIENT
Start: 2020-04-14 | End: 2020-04-15

## 2020-04-14 RX ADMIN — OLANZAPINE 10 MILLIGRAM(S): 15 TABLET, FILM COATED ORAL at 08:32

## 2020-04-14 RX ADMIN — OLANZAPINE 15 MILLIGRAM(S): 15 TABLET, FILM COATED ORAL at 17:03

## 2020-04-15 PROCEDURE — 99232 SBSQ HOSP IP/OBS MODERATE 35: CPT

## 2020-04-15 RX ORDER — OLANZAPINE 15 MG/1
10 TABLET, FILM COATED ORAL
Refills: 0 | Status: DISCONTINUED | OUTPATIENT
Start: 2020-04-15 | End: 2020-04-21

## 2020-04-15 RX ADMIN — OLANZAPINE 10 MILLIGRAM(S): 15 TABLET, FILM COATED ORAL at 09:21

## 2020-04-15 RX ADMIN — OLANZAPINE 15 MILLIGRAM(S): 15 TABLET, FILM COATED ORAL at 17:33

## 2020-04-16 RX ADMIN — OLANZAPINE 15 MILLIGRAM(S): 15 TABLET, FILM COATED ORAL at 16:02

## 2020-04-16 RX ADMIN — OLANZAPINE 10 MILLIGRAM(S): 15 TABLET, FILM COATED ORAL at 09:01

## 2020-04-17 RX ORDER — TUBERCULIN PURIFIED PROTEIN DERIVATIVE 5 [IU]/.1ML
5 INJECTION, SOLUTION INTRADERMAL ONCE
Refills: 0 | Status: COMPLETED | OUTPATIENT
Start: 2020-04-17 | End: 2020-04-17

## 2020-04-17 RX ADMIN — OLANZAPINE 10 MILLIGRAM(S): 15 TABLET, FILM COATED ORAL at 08:12

## 2020-04-17 RX ADMIN — OLANZAPINE 15 MILLIGRAM(S): 15 TABLET, FILM COATED ORAL at 17:13

## 2020-04-18 RX ADMIN — OLANZAPINE 15 MILLIGRAM(S): 15 TABLET, FILM COATED ORAL at 17:15

## 2020-04-18 RX ADMIN — OLANZAPINE 10 MILLIGRAM(S): 15 TABLET, FILM COATED ORAL at 08:30

## 2020-04-19 RX ADMIN — OLANZAPINE 10 MILLIGRAM(S): 15 TABLET, FILM COATED ORAL at 08:44

## 2020-04-19 RX ADMIN — OLANZAPINE 15 MILLIGRAM(S): 15 TABLET, FILM COATED ORAL at 17:17

## 2020-04-20 RX ADMIN — OLANZAPINE 15 MILLIGRAM(S): 15 TABLET, FILM COATED ORAL at 18:12

## 2020-04-20 RX ADMIN — OLANZAPINE 10 MILLIGRAM(S): 15 TABLET, FILM COATED ORAL at 08:41

## 2020-04-21 PROCEDURE — 99238 HOSP IP/OBS DSCHRG MGMT 30/<: CPT

## 2020-04-21 RX ORDER — OLANZAPINE 15 MG/1
1 TABLET, FILM COATED ORAL
Qty: 30 | Refills: 0
Start: 2020-04-21 | End: 2020-05-20

## 2020-04-21 RX ADMIN — OLANZAPINE 10 MILLIGRAM(S): 15 TABLET, FILM COATED ORAL at 08:20

## 2020-04-21 RX ADMIN — OLANZAPINE 15 MILLIGRAM(S): 15 TABLET, FILM COATED ORAL at 17:08

## 2021-02-23 NOTE — PROGRESS NOTE BEHAVIORAL HEALTH - NS ED BHA MED ROS CONSTITUTIONAL SYMPTOMS
No complaints
No complaints
Unable to assess
Unable to assess
Duration Of Freeze Thaw-Cycle (Seconds): 10
Post-Care Instructions: I reviewed with the patient in detail post-care instructions. Patient is to wear sunprotection, and avoid picking at any of the treated lesions. Pt may apply Vaseline And bandage  to crusted or scabbing areas.
Render Post-Care Instructions In Note?: yes
Detail Level: Detailed
Consent: The patient's consent was obtained including but not limited to risks of crusting, scabbing, blistering, scarring, darker or lighter pigmentary change, recurrence, incomplete removal and infection.
Number Of Freeze-Thaw Cycles: 2 freeze-thaw cycles
Render Note In Bullet Format When Appropriate: No

## 2021-08-11 NOTE — PROGRESS NOTE BEHAVIORAL HEALTH - NS ED BHA MED ROS PSYCHIATRIC
Ho- nurse from [facility] calling in regards to this patient. Please return call [phone].   See HPI

## 2022-09-12 NOTE — PROGRESS NOTE ADULT - SUBJECTIVE AND OBJECTIVE BOX
INTERVAL HPI/OVERNIGHT EVENTS:  Patient seen and examined at bedside. Patient remains agitated, trying to climb out of bed, hit staff. Explained to patient necessity of obtaining urine, patient stating he will not give us any urine.    MEDICATIONS  (STANDING):  enoxaparin Injectable 40 milliGRAM(s) SubCutaneous daily  risperiDONE   Tablet 1 milliGRAM(s) Oral two times a day  sodium chloride 0.9%. 1000 milliLiter(s) (75 mL/Hr) IV Continuous <Continuous>    MEDICATIONS  (PRN):  LORazepam   Injectable 1 milliGRAM(s) IV Push every 8 hours PRN Agitation  OLANZapine Injectable 10 milliGRAM(s) IntraMuscular every 6 hours PRN acute agitation      Allergies    No Known Allergies    Intolerances    Unable to obtain ROS 2/2 confusion    Vital Signs Last 24 Hrs  T(C): 36.9 (18 Mar 2020 13:09), Max: 36.9 (18 Mar 2020 13:09)  T(F): 98.4 (18 Mar 2020 13:09), Max: 98.4 (18 Mar 2020 13:09)  HR: 58 (18 Mar 2020 13:09) (58 - 69)  BP: 125/72 (18 Mar 2020 13:09) (122/74 - 159/90)  BP(mean): --  RR: 18 (18 Mar 2020 13:09) (18 - 18)  SpO2: 90% (18 Mar 2020 13:09) (90% - 93%)     @ 07:  -   @ 07:00  --------------------------------------------------------  IN: 0 mL / OUT: 2 mL / NET: -2 mL     @ 07:01  -   @ 17:33  --------------------------------------------------------  IN: 711 mL / OUT: 100 mL / NET: 611 mL      Physical Exam:  General: WN/WD NAD  Neurology: A&Ox1-2, nonfocal, TERRELL x 4  Respiratory: CTA B/L  CV: RRR, S1S2  Abdominal: Soft, NT, ND +BS  Extremities: No edema, + peripheral pulses      LABS:                        16.4   9.66  )-----------( 244      ( 17 Mar 2020 10:25 )             48.5     03-17    144  |  110<H>  |  23  ----------------------------<  92  4.1   |  27  |  1.10    Ca    11.1<H>      17 Mar 2020 10:25    TPro  7.1  /  Alb  3.1<L>  /  TBili  1.8<H>  /  DBili  x   /  AST  27  /  ALT  17  /  AlkPhos  101  03-17      Urinalysis Basic - ( 18 Mar 2020 14:20 )    Color: Yellow / Appearance: Clear / S.025 / pH: x  Gluc: x / Ketone: Small  / Bili: Small / Urobili: 4   Blood: x / Protein: 15 / Nitrite: Negative   Leuk Esterase: Negative / RBC: 0-2 /HPF / WBC 0-2   Sq Epi: x / Non Sq Epi: Occasional / Bacteria: Occasional        RADIOLOGY & ADDITIONAL TESTS: N/A

## 2023-01-11 NOTE — PROGRESS NOTE ADULT - ASSESSMENT
The patient is a 75 yo male with questionable pmhx of questionable dementia(undiagnosed) per son Orville who presents with altered mental status admitted for confusion. pending no

## 2023-03-27 NOTE — PROGRESS NOTE BEHAVIORAL HEALTH - NSBHCHARTREVIEWIMAGING_PSY_A_CORE FT
1. Monitor weights, labs, BM's, skin integrity, p.o. intake.   2. Please document % PO intake in nursing flowsheet.   3. Honor food and beverage preferences within diet restriction of patient in an effort to maximize level of nutrient intake. 
< from: CT Head No Cont (03.12.20 @ 13:47) >    EXAM:  CT BRAIN                            PROCEDURE DATE:  03/12/2020          INTERPRETATION:  INDICATION:  Confusion. Altered mental status.  TECHNIQUE:  A non contrast 2.5mm axial CT study of the brain was performed from skull base to vertex. Coronal and sagittal reformations were generated from the axial data.  COMPARISON EXAMINATION:  No prior    FINDINGS:      HEMISPHERES:  There are involutional changes with volume loss. This study is degraded by motion but is diagnostic. No acute infarct or hemorrhage is suggested. No space-occupying lesion is noted.  VENTRICLES:  Midline and normal in size.  POSTERIOR FOSSA:  The brain stem and cerebellum are unremarkable.  No CP angle lesion noted.  EXTRACEREBRAL SPACES:  No subdural or epidural collections are noted.  SKULL BASE AND CALVARIUM:  Appears intact.  No fracture or destructive lesion is identified.  SINUSES AND MASTOIDS:  Clear.  MISCELLANEOUS:  No orbital or suprasellar abnormality noted.      IMPRESSION:    1)  involutional changes with volume loss. No acute abnormality suggested.  2)  clear sinuses and mastoids..                  ROGE VARELA M.D., ATTENDING RADIOLOGIST  This document has been electronically signed. Mar 12 2020  1:57PM    < end of copied text >

## 2023-06-13 NOTE — DISCHARGE NOTE PROVIDER - NSDCMRMEDTOKEN_GEN_ALL_CORE_FT
OLANZapine 10 mg intramuscular injection: 10 milligram(s) intramuscular every 6 hours, As needed, acute agitation  OLANZapine 5 mg oral tablet, disintegratin tab(s) orally 2 times a day Undermining Type: Entire Wound